# Patient Record
Sex: MALE | Race: OTHER | NOT HISPANIC OR LATINO | URBAN - METROPOLITAN AREA
[De-identification: names, ages, dates, MRNs, and addresses within clinical notes are randomized per-mention and may not be internally consistent; named-entity substitution may affect disease eponyms.]

---

## 2019-11-15 ENCOUNTER — INPATIENT (INPATIENT)
Facility: HOSPITAL | Age: 59
LOS: 4 days | Discharge: HOME | DRG: 418 | End: 2019-11-20
Attending: SURGERY | Admitting: SURGERY
Payer: COMMERCIAL

## 2019-11-15 VITALS
OXYGEN SATURATION: 95 % | HEART RATE: 71 BPM | RESPIRATION RATE: 18 BRPM | HEIGHT: 76 IN | SYSTOLIC BLOOD PRESSURE: 193 MMHG | WEIGHT: 244.93 LBS | DIASTOLIC BLOOD PRESSURE: 125 MMHG | TEMPERATURE: 98 F

## 2019-11-15 DIAGNOSIS — Z72.0 TOBACCO USE: ICD-10-CM

## 2019-11-15 DIAGNOSIS — J43.9 EMPHYSEMA, UNSPECIFIED: ICD-10-CM

## 2019-11-15 DIAGNOSIS — R03.0 ELEVATED BLOOD-PRESSURE READING, WITHOUT DIAGNOSIS OF HYPERTENSION: ICD-10-CM

## 2019-11-15 DIAGNOSIS — Z98.890 OTHER SPECIFIED POSTPROCEDURAL STATES: Chronic | ICD-10-CM

## 2019-11-15 DIAGNOSIS — Z01.818 ENCOUNTER FOR OTHER PREPROCEDURAL EXAMINATION: ICD-10-CM

## 2019-11-15 DIAGNOSIS — K80.00 CALCULUS OF GALLBLADDER WITH ACUTE CHOLECYSTITIS WITHOUT OBSTRUCTION: ICD-10-CM

## 2019-11-15 DIAGNOSIS — R91.1 SOLITARY PULMONARY NODULE: ICD-10-CM

## 2019-11-15 DIAGNOSIS — I25.10 ATHEROSCLEROTIC HEART DISEASE OF NATIVE CORONARY ARTERY WITHOUT ANGINA PECTORIS: ICD-10-CM

## 2019-11-15 DIAGNOSIS — E66.9 OBESITY, UNSPECIFIED: ICD-10-CM

## 2019-11-15 LAB
ALBUMIN SERPL ELPH-MCNC: 3.9 G/DL — SIGNIFICANT CHANGE UP (ref 3.4–5)
ALP SERPL-CCNC: 58 U/L — SIGNIFICANT CHANGE UP (ref 40–120)
ALT FLD-CCNC: 21 U/L — SIGNIFICANT CHANGE UP (ref 12–42)
AMPHET UR-MCNC: NEGATIVE — SIGNIFICANT CHANGE UP
ANION GAP SERPL CALC-SCNC: 9 MMOL/L — SIGNIFICANT CHANGE UP (ref 9–16)
APPEARANCE UR: CLEAR — SIGNIFICANT CHANGE UP
APTT BLD: 33.4 SEC — SIGNIFICANT CHANGE UP (ref 27.5–36.3)
APTT BLD: 33.9 SEC — SIGNIFICANT CHANGE UP (ref 27.5–36.3)
AST SERPL-CCNC: 17 U/L — SIGNIFICANT CHANGE UP (ref 15–37)
BARBITURATES UR SCN-MCNC: NEGATIVE — SIGNIFICANT CHANGE UP
BENZODIAZ UR-MCNC: NEGATIVE — SIGNIFICANT CHANGE UP
BILIRUB SERPL-MCNC: 0.8 MG/DL — SIGNIFICANT CHANGE UP (ref 0.2–1.2)
BILIRUB UR-MCNC: NEGATIVE — SIGNIFICANT CHANGE UP
BUN SERPL-MCNC: 13 MG/DL — SIGNIFICANT CHANGE UP (ref 7–23)
CALCIUM SERPL-MCNC: 8.9 MG/DL — SIGNIFICANT CHANGE UP (ref 8.5–10.5)
CHLORIDE SERPL-SCNC: 105 MMOL/L — SIGNIFICANT CHANGE UP (ref 96–108)
CK SERPL-CCNC: 106 U/L — SIGNIFICANT CHANGE UP (ref 39–308)
CO2 SERPL-SCNC: 26 MMOL/L — SIGNIFICANT CHANGE UP (ref 22–31)
COCAINE METAB.OTHER UR-MCNC: NEGATIVE — SIGNIFICANT CHANGE UP
COLOR SPEC: YELLOW — SIGNIFICANT CHANGE UP
CREAT SERPL-MCNC: 0.99 MG/DL — SIGNIFICANT CHANGE UP (ref 0.5–1.3)
DIFF PNL FLD: ABNORMAL
GLUCOSE SERPL-MCNC: 125 MG/DL — HIGH (ref 70–99)
GLUCOSE UR QL: NEGATIVE — SIGNIFICANT CHANGE UP
HCT VFR BLD CALC: 45.7 % — SIGNIFICANT CHANGE UP (ref 39–50)
HGB BLD-MCNC: 16 G/DL — SIGNIFICANT CHANGE UP (ref 13–17)
INR BLD: 1.01 — SIGNIFICANT CHANGE UP (ref 0.88–1.16)
INR BLD: 1.1 — SIGNIFICANT CHANGE UP (ref 0.88–1.16)
KETONES UR-MCNC: ABNORMAL MG/DL
LEUKOCYTE ESTERASE UR-ACNC: NEGATIVE — SIGNIFICANT CHANGE UP
LIDOCAIN IGE QN: 157 U/L — SIGNIFICANT CHANGE UP (ref 73–393)
MCHC RBC-ENTMCNC: 31.1 PG — SIGNIFICANT CHANGE UP (ref 27–34)
MCHC RBC-ENTMCNC: 35 GM/DL — SIGNIFICANT CHANGE UP (ref 32–36)
MCV RBC AUTO: 88.7 FL — SIGNIFICANT CHANGE UP (ref 80–100)
METHADONE UR-MCNC: NEGATIVE — SIGNIFICANT CHANGE UP
NITRITE UR-MCNC: NEGATIVE — SIGNIFICANT CHANGE UP
NRBC # BLD: 0 /100 WBCS — SIGNIFICANT CHANGE UP (ref 0–0)
OPIATES UR-MCNC: POSITIVE
PCP SPEC-MCNC: SIGNIFICANT CHANGE UP
PCP UR-MCNC: NEGATIVE — SIGNIFICANT CHANGE UP
PH UR: 6 — SIGNIFICANT CHANGE UP (ref 5–8)
PLATELET # BLD AUTO: 238 K/UL — SIGNIFICANT CHANGE UP (ref 150–400)
POTASSIUM SERPL-MCNC: 4.1 MMOL/L — SIGNIFICANT CHANGE UP (ref 3.5–5.3)
POTASSIUM SERPL-SCNC: 4.1 MMOL/L — SIGNIFICANT CHANGE UP (ref 3.5–5.3)
PROT SERPL-MCNC: 7.3 G/DL — SIGNIFICANT CHANGE UP (ref 6.4–8.2)
PROT UR-MCNC: NEGATIVE MG/DL — SIGNIFICANT CHANGE UP
PROTHROM AB SERPL-ACNC: 11.2 SEC — SIGNIFICANT CHANGE UP (ref 10–12.9)
PROTHROM AB SERPL-ACNC: 12.5 SEC — SIGNIFICANT CHANGE UP (ref 10–12.9)
RBC # BLD: 5.15 M/UL — SIGNIFICANT CHANGE UP (ref 4.2–5.8)
RBC # FLD: 13.2 % — SIGNIFICANT CHANGE UP (ref 10.3–14.5)
SODIUM SERPL-SCNC: 140 MMOL/L — SIGNIFICANT CHANGE UP (ref 132–145)
SP GR SPEC: 1.02 — SIGNIFICANT CHANGE UP (ref 1–1.03)
THC UR QL: NEGATIVE — SIGNIFICANT CHANGE UP
TROPONIN I SERPL-MCNC: <0.017 NG/ML — LOW (ref 0.02–0.06)
UROBILINOGEN FLD QL: 0.2 E.U./DL — SIGNIFICANT CHANGE UP
WBC # BLD: 11.59 K/UL — HIGH (ref 3.8–10.5)
WBC # FLD AUTO: 11.59 K/UL — HIGH (ref 3.8–10.5)

## 2019-11-15 PROCEDURE — 71275 CT ANGIOGRAPHY CHEST: CPT | Mod: 26

## 2019-11-15 PROCEDURE — 71045 X-RAY EXAM CHEST 1 VIEW: CPT | Mod: 26

## 2019-11-15 PROCEDURE — 93010 ELECTROCARDIOGRAM REPORT: CPT

## 2019-11-15 PROCEDURE — 99223 1ST HOSP IP/OBS HIGH 75: CPT | Mod: GC

## 2019-11-15 PROCEDURE — 93010 ELECTROCARDIOGRAM REPORT: CPT | Mod: 59

## 2019-11-15 PROCEDURE — 99285 EMERGENCY DEPT VISIT HI MDM: CPT

## 2019-11-15 PROCEDURE — 74174 CTA ABD&PLVS W/CONTRAST: CPT | Mod: 26

## 2019-11-15 PROCEDURE — 76700 US EXAM ABDOM COMPLETE: CPT | Mod: 26

## 2019-11-15 RX ORDER — SODIUM CHLORIDE 9 MG/ML
1000 INJECTION INTRAMUSCULAR; INTRAVENOUS; SUBCUTANEOUS ONCE
Refills: 0 | Status: COMPLETED | OUTPATIENT
Start: 2019-11-15 | End: 2019-11-15

## 2019-11-15 RX ORDER — ENOXAPARIN SODIUM 100 MG/ML
40 INJECTION SUBCUTANEOUS EVERY 24 HOURS
Refills: 0 | Status: DISCONTINUED | OUTPATIENT
Start: 2019-11-15 | End: 2019-11-16

## 2019-11-15 RX ORDER — LABETALOL HCL 100 MG
10 TABLET ORAL ONCE
Refills: 0 | Status: COMPLETED | OUTPATIENT
Start: 2019-11-15 | End: 2019-11-15

## 2019-11-15 RX ORDER — ONDANSETRON 8 MG/1
4 TABLET, FILM COATED ORAL EVERY 6 HOURS
Refills: 0 | Status: DISCONTINUED | OUTPATIENT
Start: 2019-11-15 | End: 2019-11-20

## 2019-11-15 RX ORDER — SODIUM CHLORIDE 9 MG/ML
1000 INJECTION, SOLUTION INTRAVENOUS
Refills: 0 | Status: DISCONTINUED | OUTPATIENT
Start: 2019-11-15 | End: 2019-11-20

## 2019-11-15 RX ORDER — CEFTRIAXONE 500 MG/1
1000 INJECTION, POWDER, FOR SOLUTION INTRAMUSCULAR; INTRAVENOUS EVERY 24 HOURS
Refills: 0 | Status: COMPLETED | OUTPATIENT
Start: 2019-11-15 | End: 2019-11-15

## 2019-11-15 RX ORDER — HYDROMORPHONE HYDROCHLORIDE 2 MG/ML
0.5 INJECTION INTRAMUSCULAR; INTRAVENOUS; SUBCUTANEOUS EVERY 4 HOURS
Refills: 0 | Status: DISCONTINUED | OUTPATIENT
Start: 2019-11-15 | End: 2019-11-20

## 2019-11-15 RX ORDER — METRONIDAZOLE 500 MG
500 TABLET ORAL ONCE
Refills: 0 | Status: DISCONTINUED | OUTPATIENT
Start: 2019-11-15 | End: 2019-11-15

## 2019-11-15 RX ORDER — MORPHINE SULFATE 50 MG/1
2 CAPSULE, EXTENDED RELEASE ORAL ONCE
Refills: 0 | Status: DISCONTINUED | OUTPATIENT
Start: 2019-11-15 | End: 2019-11-15

## 2019-11-15 RX ORDER — ACETAMINOPHEN 500 MG
1000 TABLET ORAL ONCE
Refills: 0 | Status: COMPLETED | OUTPATIENT
Start: 2019-11-15 | End: 2019-11-15

## 2019-11-15 RX ORDER — HYDROMORPHONE HYDROCHLORIDE 2 MG/ML
0.5 INJECTION INTRAMUSCULAR; INTRAVENOUS; SUBCUTANEOUS ONCE
Refills: 0 | Status: DISCONTINUED | OUTPATIENT
Start: 2019-11-15 | End: 2019-11-15

## 2019-11-15 RX ORDER — INFLUENZA VIRUS VACCINE 15; 15; 15; 15 UG/.5ML; UG/.5ML; UG/.5ML; UG/.5ML
0.5 SUSPENSION INTRAMUSCULAR ONCE
Refills: 0 | Status: DISCONTINUED | OUTPATIENT
Start: 2019-11-15 | End: 2019-11-20

## 2019-11-15 RX ORDER — CEFTRIAXONE 500 MG/1
1000 INJECTION, POWDER, FOR SOLUTION INTRAMUSCULAR; INTRAVENOUS EVERY 24 HOURS
Refills: 0 | Status: DISCONTINUED | OUTPATIENT
Start: 2019-11-16 | End: 2019-11-20

## 2019-11-15 RX ORDER — HYDROMORPHONE HYDROCHLORIDE 2 MG/ML
1 INJECTION INTRAMUSCULAR; INTRAVENOUS; SUBCUTANEOUS EVERY 4 HOURS
Refills: 0 | Status: DISCONTINUED | OUTPATIENT
Start: 2019-11-15 | End: 2019-11-20

## 2019-11-15 RX ORDER — METRONIDAZOLE 500 MG
500 TABLET ORAL EVERY 8 HOURS
Refills: 0 | Status: DISCONTINUED | OUTPATIENT
Start: 2019-11-15 | End: 2019-11-20

## 2019-11-15 RX ADMIN — Medication 400 MILLIGRAM(S): at 22:43

## 2019-11-15 RX ADMIN — Medication 10 MILLIGRAM(S): at 09:56

## 2019-11-15 RX ADMIN — HYDROMORPHONE HYDROCHLORIDE 0.5 MILLIGRAM(S): 2 INJECTION INTRAMUSCULAR; INTRAVENOUS; SUBCUTANEOUS at 21:29

## 2019-11-15 RX ADMIN — Medication 100 MILLIGRAM(S): at 21:29

## 2019-11-15 RX ADMIN — SODIUM CHLORIDE 1000 MILLILITER(S): 9 INJECTION INTRAMUSCULAR; INTRAVENOUS; SUBCUTANEOUS at 17:09

## 2019-11-15 RX ADMIN — HYDROMORPHONE HYDROCHLORIDE 0.5 MILLIGRAM(S): 2 INJECTION INTRAMUSCULAR; INTRAVENOUS; SUBCUTANEOUS at 22:14

## 2019-11-15 RX ADMIN — MORPHINE SULFATE 2 MILLIGRAM(S): 50 CAPSULE, EXTENDED RELEASE ORAL at 09:50

## 2019-11-15 RX ADMIN — Medication 1000 MILLIGRAM(S): at 23:13

## 2019-11-15 RX ADMIN — CEFTRIAXONE 100 MILLIGRAM(S): 500 INJECTION, POWDER, FOR SOLUTION INTRAMUSCULAR; INTRAVENOUS at 17:08

## 2019-11-15 RX ADMIN — Medication 10 MILLIGRAM(S): at 10:07

## 2019-11-15 RX ADMIN — SODIUM CHLORIDE 250 MILLILITER(S): 9 INJECTION INTRAMUSCULAR; INTRAVENOUS; SUBCUTANEOUS at 09:45

## 2019-11-15 RX ADMIN — ENOXAPARIN SODIUM 40 MILLIGRAM(S): 100 INJECTION SUBCUTANEOUS at 21:33

## 2019-11-15 RX ADMIN — SODIUM CHLORIDE 140 MILLILITER(S): 9 INJECTION, SOLUTION INTRAVENOUS at 21:29

## 2019-11-15 RX ADMIN — HYDROMORPHONE HYDROCHLORIDE 0.5 MILLIGRAM(S): 2 INJECTION INTRAMUSCULAR; INTRAVENOUS; SUBCUTANEOUS at 17:08

## 2019-11-15 NOTE — CONSULT NOTE ADULT - PROBLEM SELECTOR RECOMMENDATION 9
Pt with findings of acute cholecysitis   - Lap moses 11/16  - fup surgery recs Pt with findings of acute cholecystitis on US and CTA.   - Plan for Lap moses per surgery team   - f/up surgery recs  - NPO

## 2019-11-15 NOTE — ED PROVIDER NOTE - OBJECTIVE STATEMENT
60 yo M w/ no pertinent PMHx BIBEMS from urgent care c/o abdominal/back pain with vomiting since yesterday. Pt was sent in from urgent care after pt was persistently hypertensive during exam. Pt notes onset of symptoms last night with multiple episodes of vomiting. Denies fever, chills, dysuria, hematuria, flank pain, change in urinary/bowel function, numbness, tingling, diarrhea, CP, SOB, palpitations, diaphoresis, dizziness, HA, cough.

## 2019-11-15 NOTE — ED ADULT NURSE NOTE - NSIMPLEMENTINTERV_GEN_ALL_ED
Implemented All Universal Safety Interventions:  Trade to call system. Call bell, personal items and telephone within reach. Instruct patient to call for assistance. Room bathroom lighting operational. Non-slip footwear when patient is off stretcher. Physically safe environment: no spills, clutter or unnecessary equipment. Stretcher in lowest position, wheels locked, appropriate side rails in place.

## 2019-11-15 NOTE — CONSULT NOTE ADULT - ASSESSMENT
60 yo F with PMH of obesity, chronic low back pain, rotator cuff repair, OA  presents with one day hx of abdominal pain. Pain localized to mid epigastric, RUQ region and characterized as dul/aching pain. Found to have acute cholecystitis along with findings of severe coronary calcifications on CTA. Medicine consulted for pre op clearance.

## 2019-11-15 NOTE — CONSULT NOTE ADULT - PROBLEM SELECTOR RECOMMENDATION 7
Emphysematous changes with small and large airways inflammation. 6 mm solid nodule in the left lower lobe. As per Fleischner society 2017 guidelines, interval surveillance in both lower and high risk patients in 6-12 months is suggested to confirm stability.- possible findings of underlying COPD?  - pt currently asymptomatic, saturating well on RA   - oupt pulm follow up for PFT and repeat testing in 6 months for nodule.

## 2019-11-15 NOTE — CONSULT NOTE ADULT - PROBLEM SELECTOR RECOMMENDATION 8
ADDENDUM: multiple lesions found incidentally on CT;   possible benign hemangiomas however will need further workup as outpt w/ MRI or hepatic CT

## 2019-11-15 NOTE — H&P ADULT - HISTORY OF PRESENT ILLNESS
58 y/o male w/ PMHx of Obesity and newly diagnosed HTN and Coronary artery disease (diagnosed on CT Angio), PSHx of rotator cuff repair, presenting w/ one day of constant, poorly localized, abdominal pain, associated w/ NBNB emesis, subjective fever and chills. Denies chest pain, SOB, or change in bowel function. Patient was referred from Urgent care to [facility] after presenting w/ abdominal pain and lower back pain, and BP of high 170s, previously had no known history of HTN. At [facility] he received a CT Angio of the chest and abdomen, which revealed severe coronary artery calcifications as well as moderate to severe atherosclerotic disease of the abdominal and thoracic Aorta. CT also revealed emphysematous changes of the lungs and multiple hypervascular lesions in the liver (likely hemangiomas). Of note patient had colonoscopy 8 years ago, revealed benign polyp (patient unsure if it was removed.)

## 2019-11-15 NOTE — CONSULT NOTE ADULT - PROBLEM SELECTOR RECOMMENDATION 4
Pt found to have /90s on admission in setting of abdominal pain 2/2 acute cholecystitis. No hx of HTN. Pt denies CP, SOB, numbess/tingling. Pt found to have /90s on admission in setting of abdominal pain 2/2 acute cholecystitis. No hx of HTN. Pt denies CP, SOB, numbess/tingling.  - per pt, previous BP readings all normal   - elevated BP likely 2/2 pain although cannot r/o underlying esstential HTN  - continue to monitor   - pain control

## 2019-11-15 NOTE — ED ADULT TRIAGE NOTE - CHIEF COMPLAINT QUOTE
BIBA from urgent care for being hypertensive in office. pt reports N/V, generalized abdominal pain, and lower back pain since yesterday. denies any PMHX, drug or alcohol use. BIBA from urgent care for being hypertensive in office. pt reports N/V, generalized abdominal pain, and lower back pain since yesterday. denies any PMHX, drug or alcohol use. received 8mg zofran ODT PTA from .

## 2019-11-15 NOTE — ED ADULT NURSE NOTE - CHIEF COMPLAINT QUOTE
BIBA from urgent care for being hypertensive in office. pt reports N/V, generalized abdominal pain, and lower back pain since yesterday. denies any PMHX, drug or alcohol use. received 8mg zofran ODT PTA from .

## 2019-11-15 NOTE — H&P ADULT - NSHPPHYSICALEXAM_GEN_ALL_CORE
Vital Signs Last 24 Hrs  T(C): 37.7 (15 Nov 2019 18:19), Max: 37.7 (15 Nov 2019 18:19)  T(F): 99.9 (15 Nov 2019 18:19), Max: 99.9 (15 Nov 2019 18:19)  HR: 75 (15 Nov 2019 18:19) (69 - 80)  BP: 172/90 (15 Nov 2019 18:19) (158/93 - 193/125)  BP(mean): --  RR: 18 (15 Nov 2019 18:19) (16 - 18)  SpO2: 94% (15 Nov 2019 18:19) (94% - 96%    Physical Exam:  General Appearance: Appears well, NAD  HEENT: Grossly symmetric  Chest: Non labored breathing  CV: Pulse regular presently  Abdomen: Soft, RUQ tenderness w/ positive Murphs sign, nondistended, no rebound or guarding   Extremities: Grossly symmetric, no swelling, warm.

## 2019-11-15 NOTE — H&P ADULT - NSHPLABSRESULTS_GEN_ALL_CORE
LABS:                        16.0   11.59 )-----------( 238      ( 15 Nov 2019 09:50 )             45.7     11-15    140  |  105  |  13  ----------------------------<  125<H>  4.1   |  26  |  0.99    Ca    8.9      15 Nov 2019 09:50    TPro  7.3  /  Alb  3.9  /  TBili  0.8  /  DBili  x   /  AST  17  /  ALT  21  /  AlkPhos  58  11-15    PT/INR - ( 15 Nov 2019 09:50 )   PT: 11.2 sec;   INR: 1.01          PTT - ( 15 Nov 2019 09:50 )  PTT:33.9 sec      RADIOLOGY & ADDITIONAL STUDIES:  Ultrasound positive for cholecystitis

## 2019-11-15 NOTE — CONSULT NOTE ADULT - PROBLEM SELECTOR RECOMMENDATION 2
- RCRI class I risk= 3/9 % risk of CV event, including MI  - Misty preiopereative risk= 0.1 % Pt with no significant PMH, found to have severe coronary artery calcifications ans well as moderate to severe atherosclerotic dx of the abdominal and thoracic Aorta. Pt with no hx of CAD, no FH CAD. Previous stress test ~10years ago was normal. Pt denies CP, mild WORLEY when walking >10blocks. Previous smoking hx, quit 10yrs ago, previous hx of alcohol use.   - RCRI class I risk= 3/9 % risk of CV event, including MI  - Misty preiopereative risk= 0.1 %  - recommend Cardiology clearance   - f/up a1c, lipid profile, TSH  - f/up echocardiogram Pt with no significant PMH, found to have severe coronary artery calcifications ans well as moderate to severe atherosclerotic dx of the abdominal and thoracic Aorta. Pt with no hx of CAD, no FH CAD. Previous stress test ~10years ago was normal. Pt denies CP, mild WORLEY when walking >10blocks. Previous smoking hx, quit 10yrs ago, previous hx of alcohol use.   - RCRI class I risk= 3.9 % risk of CV event, including MI  - Jay preiopereative risk= 0.1 %  - recommend Cardiology clearance   - f/up a1c, lipid profile, TSH  - f/up echocardiogram

## 2019-11-15 NOTE — CONSULT NOTE ADULT - ATTENDING COMMENTS
patient seen and examined   reviewed pertinent data, included EKG;  h&p w/ edits   PE findings as above , except pt w/ slight RUQ pain (received pain medications); pt appears slightly uncomfortable and tired appearing    a/p:   1. preop for acute cholecystitis : pt reports able to walk 10 blocks w/o WORLEY; able to walk up 2 flights of stairs w/o dyspnea. appears to have good functional status w/ RCRI Class 1 for intermediate risk procedure. However found to have incidental severe coronary artery calcification and atherosclerotic disease at the aorta on CT angio c/a/p; pt denies hx or sxs of ischemic heart disease; Given CT findings pt would benefit from cardiac evaluation prior to procedure, will likely need statin therapy initiated prior to procedure as well for plaque stabilization;  awaiting lipid panel (to determine statin dosing intensity), A1c, TSH, ECHO. followup cardiology recs.     2. In addition, pt would benefit from anti-hypertensive therapy if BP remains elevated post op.     3. monitor for pulm signs of COPD exacerbation post procedure     rest of plan as above   medicine will follow post op   medical decision making : high complexity patient seen and examined   reviewed pertinent data, included EKG;  h&p w/ edits   PE findings as above , except pt w/ slight RUQ pain (received pain medications); pt appears slightly uncomfortable and tired appearing    a/p:   1. preop for acute cholecystitis : pt reports that he is able to walk 10 blocks w/o WORLEY; able to walk up 2 flights of stairs w/o dyspnea. appears to have good functional status w/ RCRI Class 1 for intermediate risk procedure. However found to have incidental severe coronary artery calcification and atherosclerotic disease at the aorta on CT angio c/a/p; pt denies hx or sxs of ischemic heart disease; Given CT findings pt would benefit from cardiac evaluation prior to procedure, will likely need statin therapy initiated prior to procedure as well for plaque stabilization;  awaiting lipid panel (to determine statin dosing intensity), A1c, TSH, ECHO. followup cardiology recs.     2. In addition, pt would benefit from anti-hypertensive therapy if BP remains elevated post op.     3. pt w/ inicidental lung findings suggesting possible COPD; denies respiratory sxs;  monitor for sxs/signs of COPD exacerbation post procedure ; duonebs prn    rest of plan as above   medicine will follow post op   medical decision making : high complexity

## 2019-11-15 NOTE — CONSULT NOTE ADULT - PROBLEM SELECTOR RECOMMENDATION 3
CT Angio completed which shows severe coronary artery calcifications ans well as moderate to severe atherosclerotic dx of the abdominal and thoracic Aorta. Pt with no hx of CAD, no FH CAD. Previous stress test ~10years ago was normal. Pt denies CP, mild WORLEY when walking >10blocks. Previous smoking hx, quit 10yrs ago, previous hx of alcohol use.   - ECG   - f/up echocardiogram   - f/up A1c, Lipid profile, TSH  - cardiac clearance CT Angio completed which shows severe coronary artery calcifications ans well as moderate to severe atherosclerotic dx of the abdominal and thoracic Aorta. Pt with no hx of CAD, no FH CAD. Previous stress test ~10years ago was normal. Pt denies CP, mild WORLEY when walking >10blocks. Previous smoking hx, quit 10yrs ago, previous hx of alcohol use.   - repeat ECG   - f/up echocardiogram   - f/up A1c, Lipid profile, TSH  - cardiac clearance

## 2019-11-15 NOTE — CONSULT NOTE ADULT - PROBLEM SELECTOR RECOMMENDATION 6
Emphysematous changes with small and large airways inflammation. 6 mm   solid nodule in the left lower lobe. As per Fleischner society 2017   guidelines, interval surveillance in both lower and high risk patients in   6-12 months is suggested to confirm stability. Emphysematous changes with small and large airways inflammation. 6 mm solid nodule in the left lower lobe. As per Fleischner society 2017 guidelines, interval surveillance in both lower and high risk patients in 6-12 months is suggested to confirm stability.  - oupt pulm follow up for PFT and repeat testing in 6 months for nodule.

## 2019-11-15 NOTE — H&P ADULT - ASSESSMENT
59M PMHx obesity and newly diagnosed HTN, CAD presents w/ acute cholecystitis    Pain/Nausea  NPO/IVF  Cef/Flagyl  OOBA/SCDs/SQH  Pre op for OR 11/16  Medicine consult  Cards consult

## 2019-11-16 DIAGNOSIS — I25.10 ATHEROSCLEROTIC HEART DISEASE OF NATIVE CORONARY ARTERY WITHOUT ANGINA PECTORIS: ICD-10-CM

## 2019-11-16 DIAGNOSIS — K76.9 LIVER DISEASE, UNSPECIFIED: ICD-10-CM

## 2019-11-16 LAB
ALBUMIN SERPL ELPH-MCNC: 3.6 G/DL — SIGNIFICANT CHANGE UP (ref 3.3–5)
ALBUMIN SERPL ELPH-MCNC: 3.8 G/DL — SIGNIFICANT CHANGE UP (ref 3.3–5)
ALP SERPL-CCNC: 50 U/L — SIGNIFICANT CHANGE UP (ref 40–120)
ALP SERPL-CCNC: 50 U/L — SIGNIFICANT CHANGE UP (ref 40–120)
ALT FLD-CCNC: 22 U/L — SIGNIFICANT CHANGE UP (ref 10–45)
ALT FLD-CCNC: 53 U/L — HIGH (ref 10–45)
ANION GAP SERPL CALC-SCNC: 11 MMOL/L — SIGNIFICANT CHANGE UP (ref 5–17)
ANION GAP SERPL CALC-SCNC: 12 MMOL/L — SIGNIFICANT CHANGE UP (ref 5–17)
AST SERPL-CCNC: 21 U/L — SIGNIFICANT CHANGE UP (ref 10–40)
AST SERPL-CCNC: 57 U/L — HIGH (ref 10–40)
BILIRUB SERPL-MCNC: 1 MG/DL — SIGNIFICANT CHANGE UP (ref 0.2–1.2)
BILIRUB SERPL-MCNC: 1.4 MG/DL — HIGH (ref 0.2–1.2)
BUN SERPL-MCNC: 10 MG/DL — SIGNIFICANT CHANGE UP (ref 7–23)
BUN SERPL-MCNC: 12 MG/DL — SIGNIFICANT CHANGE UP (ref 7–23)
CALCIUM SERPL-MCNC: 8.3 MG/DL — LOW (ref 8.4–10.5)
CALCIUM SERPL-MCNC: 8.4 MG/DL — SIGNIFICANT CHANGE UP (ref 8.4–10.5)
CHLORIDE SERPL-SCNC: 102 MMOL/L — SIGNIFICANT CHANGE UP (ref 96–108)
CHLORIDE SERPL-SCNC: 103 MMOL/L — SIGNIFICANT CHANGE UP (ref 96–108)
CHOLEST SERPL-MCNC: 170 MG/DL — SIGNIFICANT CHANGE UP (ref 10–199)
CO2 SERPL-SCNC: 23 MMOL/L — SIGNIFICANT CHANGE UP (ref 22–31)
CO2 SERPL-SCNC: 24 MMOL/L — SIGNIFICANT CHANGE UP (ref 22–31)
CREAT SERPL-MCNC: 0.88 MG/DL — SIGNIFICANT CHANGE UP (ref 0.5–1.3)
CREAT SERPL-MCNC: 0.95 MG/DL — SIGNIFICANT CHANGE UP (ref 0.5–1.3)
GLUCOSE SERPL-MCNC: 122 MG/DL — HIGH (ref 70–99)
GLUCOSE SERPL-MCNC: 162 MG/DL — HIGH (ref 70–99)
HBA1C BLD-MCNC: 5.3 % — SIGNIFICANT CHANGE UP (ref 4–5.6)
HCT VFR BLD CALC: 44 % — SIGNIFICANT CHANGE UP (ref 39–50)
HCT VFR BLD CALC: 44.9 % — SIGNIFICANT CHANGE UP (ref 39–50)
HCV AB S/CO SERPL IA: 0.15 S/CO — SIGNIFICANT CHANGE UP
HCV AB SERPL-IMP: SIGNIFICANT CHANGE UP
HDLC SERPL-MCNC: 49 MG/DL — SIGNIFICANT CHANGE UP
HGB BLD-MCNC: 14.4 G/DL — SIGNIFICANT CHANGE UP (ref 13–17)
HGB BLD-MCNC: 15 G/DL — SIGNIFICANT CHANGE UP (ref 13–17)
INR BLD: 1.18 — HIGH (ref 0.88–1.16)
LIPID PNL WITH DIRECT LDL SERPL: 104 MG/DL — HIGH
MAGNESIUM SERPL-MCNC: 1.9 MG/DL — SIGNIFICANT CHANGE UP (ref 1.6–2.6)
MCHC RBC-ENTMCNC: 30.2 PG — SIGNIFICANT CHANGE UP (ref 27–34)
MCHC RBC-ENTMCNC: 30.4 PG — SIGNIFICANT CHANGE UP (ref 27–34)
MCHC RBC-ENTMCNC: 32.7 GM/DL — SIGNIFICANT CHANGE UP (ref 32–36)
MCHC RBC-ENTMCNC: 33.4 GM/DL — SIGNIFICANT CHANGE UP (ref 32–36)
MCV RBC AUTO: 91.1 FL — SIGNIFICANT CHANGE UP (ref 80–100)
MCV RBC AUTO: 92.2 FL — SIGNIFICANT CHANGE UP (ref 80–100)
NRBC # BLD: 0 /100 WBCS — SIGNIFICANT CHANGE UP (ref 0–0)
NRBC # BLD: 0 /100 WBCS — SIGNIFICANT CHANGE UP (ref 0–0)
PHOSPHATE SERPL-MCNC: 3.1 MG/DL — SIGNIFICANT CHANGE UP (ref 2.5–4.5)
PLATELET # BLD AUTO: 198 K/UL — SIGNIFICANT CHANGE UP (ref 150–400)
PLATELET # BLD AUTO: 199 K/UL — SIGNIFICANT CHANGE UP (ref 150–400)
POTASSIUM SERPL-MCNC: 3.8 MMOL/L — SIGNIFICANT CHANGE UP (ref 3.5–5.3)
POTASSIUM SERPL-MCNC: 4 MMOL/L — SIGNIFICANT CHANGE UP (ref 3.5–5.3)
POTASSIUM SERPL-SCNC: 3.8 MMOL/L — SIGNIFICANT CHANGE UP (ref 3.5–5.3)
POTASSIUM SERPL-SCNC: 4 MMOL/L — SIGNIFICANT CHANGE UP (ref 3.5–5.3)
PROT SERPL-MCNC: 6.4 G/DL — SIGNIFICANT CHANGE UP (ref 6–8.3)
PROT SERPL-MCNC: 6.7 G/DL — SIGNIFICANT CHANGE UP (ref 6–8.3)
PROTHROM AB SERPL-ACNC: 13.4 SEC — HIGH (ref 10–12.9)
RBC # BLD: 4.77 M/UL — SIGNIFICANT CHANGE UP (ref 4.2–5.8)
RBC # BLD: 4.93 M/UL — SIGNIFICANT CHANGE UP (ref 4.2–5.8)
RBC # FLD: 13.2 % — SIGNIFICANT CHANGE UP (ref 10.3–14.5)
RBC # FLD: 13.3 % — SIGNIFICANT CHANGE UP (ref 10.3–14.5)
SODIUM SERPL-SCNC: 137 MMOL/L — SIGNIFICANT CHANGE UP (ref 135–145)
SODIUM SERPL-SCNC: 138 MMOL/L — SIGNIFICANT CHANGE UP (ref 135–145)
TOTAL CHOLESTEROL/HDL RATIO MEASUREMENT: 3.5 RATIO — SIGNIFICANT CHANGE UP (ref 3.4–9.6)
TRIGL SERPL-MCNC: 84 MG/DL — SIGNIFICANT CHANGE UP (ref 10–149)
TSH SERPL-MCNC: 0.68 UIU/ML — SIGNIFICANT CHANGE UP (ref 0.35–4.94)
WBC # BLD: 10.9 K/UL — HIGH (ref 3.8–10.5)
WBC # BLD: 11.19 K/UL — HIGH (ref 3.8–10.5)
WBC # FLD AUTO: 10.9 K/UL — HIGH (ref 3.8–10.5)
WBC # FLD AUTO: 11.19 K/UL — HIGH (ref 3.8–10.5)

## 2019-11-16 PROCEDURE — 99221 1ST HOSP IP/OBS SF/LOW 40: CPT

## 2019-11-16 RX ORDER — ATORVASTATIN CALCIUM 80 MG/1
40 TABLET, FILM COATED ORAL DAILY
Refills: 0 | Status: DISCONTINUED | OUTPATIENT
Start: 2019-11-16 | End: 2019-11-16

## 2019-11-16 RX ORDER — MORPHINE SULFATE 50 MG/1
2 CAPSULE, EXTENDED RELEASE ORAL ONCE
Refills: 0 | Status: DISCONTINUED | OUTPATIENT
Start: 2019-11-16 | End: 2019-11-16

## 2019-11-16 RX ADMIN — Medication 100 MILLIGRAM(S): at 21:32

## 2019-11-16 RX ADMIN — CEFTRIAXONE 100 MILLIGRAM(S): 500 INJECTION, POWDER, FOR SOLUTION INTRAMUSCULAR; INTRAVENOUS at 17:02

## 2019-11-16 RX ADMIN — ATORVASTATIN CALCIUM 40 MILLIGRAM(S): 80 TABLET, FILM COATED ORAL at 06:23

## 2019-11-16 RX ADMIN — MORPHINE SULFATE 2 MILLIGRAM(S): 50 CAPSULE, EXTENDED RELEASE ORAL at 14:45

## 2019-11-16 RX ADMIN — SODIUM CHLORIDE 140 MILLILITER(S): 9 INJECTION, SOLUTION INTRAVENOUS at 08:51

## 2019-11-16 RX ADMIN — HYDROMORPHONE HYDROCHLORIDE 0.5 MILLIGRAM(S): 2 INJECTION INTRAMUSCULAR; INTRAVENOUS; SUBCUTANEOUS at 06:23

## 2019-11-16 RX ADMIN — Medication 100 MILLIGRAM(S): at 15:01

## 2019-11-16 RX ADMIN — HYDROMORPHONE HYDROCHLORIDE 1 MILLIGRAM(S): 2 INJECTION INTRAMUSCULAR; INTRAVENOUS; SUBCUTANEOUS at 23:01

## 2019-11-16 RX ADMIN — SODIUM CHLORIDE 140 MILLILITER(S): 9 INJECTION, SOLUTION INTRAVENOUS at 17:03

## 2019-11-16 RX ADMIN — HYDROMORPHONE HYDROCHLORIDE 0.5 MILLIGRAM(S): 2 INJECTION INTRAMUSCULAR; INTRAVENOUS; SUBCUTANEOUS at 19:13

## 2019-11-16 RX ADMIN — HYDROMORPHONE HYDROCHLORIDE 0.5 MILLIGRAM(S): 2 INJECTION INTRAMUSCULAR; INTRAVENOUS; SUBCUTANEOUS at 14:09

## 2019-11-16 RX ADMIN — Medication 100 MILLIGRAM(S): at 05:03

## 2019-11-16 RX ADMIN — HYDROMORPHONE HYDROCHLORIDE 0.5 MILLIGRAM(S): 2 INJECTION INTRAMUSCULAR; INTRAVENOUS; SUBCUTANEOUS at 07:21

## 2019-11-16 RX ADMIN — HYDROMORPHONE HYDROCHLORIDE 0.5 MILLIGRAM(S): 2 INJECTION INTRAMUSCULAR; INTRAVENOUS; SUBCUTANEOUS at 18:58

## 2019-11-16 RX ADMIN — HYDROMORPHONE HYDROCHLORIDE 1 MILLIGRAM(S): 2 INJECTION INTRAMUSCULAR; INTRAVENOUS; SUBCUTANEOUS at 23:45

## 2019-11-16 RX ADMIN — MORPHINE SULFATE 2 MILLIGRAM(S): 50 CAPSULE, EXTENDED RELEASE ORAL at 15:15

## 2019-11-16 RX ADMIN — Medication 1 DROP(S): at 19:34

## 2019-11-16 NOTE — PROVIDER CONTACT NOTE (OTHER) - ASSESSMENT
Pt is currently on telemetry monitoring but there are two different orders for telemetry monitoring and regional bed.

## 2019-11-16 NOTE — PROGRESS NOTE ADULT - SUBJECTIVE AND OBJECTIVE BOX
Procedure: Laparoscopic cholecystectomy  Surgeon: Shashi    S: Pt states he has some soreness near incision sites, but feels better than prior to surgery. Denies N/V, CP, SOB, WORLEY, calf tenderness. Pain controlled with medication.    O:  T(C): 36.3 (11-16-19 @ 15:30), Max: 36.3 (11-16-19 @ 15:30)  T(F): 97.3 (11-16-19 @ 15:30), Max: 97.3 (11-16-19 @ 15:30)  HR: 78 (11-16-19 @ 15:30) (70 - 84)  BP: 131/80 (11-16-19 @ 15:30) (131/80 - 169/90)  RR: 19 (11-16-19 @ 15:30) (11 - 19)  SpO2: 95% (11-16-19 @ 15:30) (92% - 95%)  Wt(kg): --                        15.0   11.19 )-----------( 198      ( 16 Nov 2019 05:48 )             44.9     11-16    138  |  102  |  10  ----------------------------<  122<H>  3.8   |  24  |  0.95    Ca    8.4      16 Nov 2019 05:48  Phos  3.1     11-16  Mg     1.9     11-16    TPro  6.7  /  Alb  3.8  /  TBili  1.4<H>  /  DBili  x   /  AST  21  /  ALT  22  /  AlkPhos  50  11-16      Gen: NAD, resting comfortably in bed  C/V: NSR  Pulm: Nonlabored breathing, no respiratory distress  Abd: soft, tender near incision sites, non-distended. Incision; c/d/i, lyric in place with sanguinous output  Extrem: WWP, no calf edema, SCDs in place      A/P: 59yMale s/p above procedure  Diet: NPO  IVF: LR  Pain/nausea control  DVT ppx: scds  Dispo plan: tele

## 2019-11-16 NOTE — CONSULT NOTE ADULT - ATTENDING COMMENTS
Assessment: Patient personally seen and examined myself during rounds with the Fellow  ON DATE 11/16/19  Note read, including vitals, physical findings, laboratory data, and radiological reports.   Agree with above with revisions, if any included below.   - As Above  - Proceed to surgery

## 2019-11-16 NOTE — CONSULT NOTE ADULT - SUBJECTIVE AND OBJECTIVE BOX
HPI:  59 F w/ no sig pmhx presents with one day hx of abdominal pain. Pain localized to mid epigastric, RUQ region and characterized as dul/aching pain. Associated symptoms include 15 episodes of NBNB vomiting, worsening lower back pain, and subjective fevers/chills. Patient admitted to surgical service for acute cholecystitis, plan for laparoscopic cholecystectomy today 19.  Patient had CT Angio due to lower back pain and SBP in 170s. CT Angio completed which shows severe coronary artery calcifications and well as moderate to severe atherosclerotic dx of the abdominal and thoracic Aorta as well as findings of acute cholecystitis. Cardiology consulted for pre-operative clearance.     Patient seen and examined. He reports hx a diagnosis of "high cholesterol", which has been treated with "diet and exercise". Patient does not take any medications at home. He has no chest pain or exertional symptoms. He exercises 3 times weekly, able to walk > 10 blocks, > 3 flights of stairs. Reports doing high intensity exercises as well without chest discomfort. No family hx of cardiovascular disease.       CARDIAC DIAGNOSTIC TESTING:      ECG: NSR, no ischemic changes     ECHO  FINDINGS: pending     PAST MEDICAL & SURGICAL HISTORY:  Obesity  HTN (hypertension)  Coronary artery disease  S/P rotator cuff repair      HOME MEDICATIONS  T(C): 37.1 (19 @ 05:02), Max: 38 (11-15-19 @ 21:09)  HR: 85 (19 @ 05:02) (69 - 93)  BP: 164/74 (19 @ 05:02) (133/80 - 193/125)  RR: 17 (19 @ 05:02) (16 - 18)  SpO2: 93% (19 @ 05:02) (93% - 96%)    MEDICATIONS  (STANDING):  cefTRIAXone   IVPB 1000 milliGRAM(s) IV Intermittent every 24 hours  enoxaparin Injectable 40 milliGRAM(s) SubCutaneous every 24 hours  influenza   Vaccine 0.5 milliLiter(s) IntraMuscular once  lactated ringers. 1000 milliLiter(s) (140 mL/Hr) IV Continuous <Continuous>  metroNIDAZOLE  IVPB 500 milliGRAM(s) IV Intermittent every 8 hours    MEDICATIONS  (PRN):  HYDROmorphone  Injectable 0.5 milliGRAM(s) IV Push every 4 hours PRN Moderate Pain (4 - 6)  HYDROmorphone  Injectable 1 milliGRAM(s) IV Push every 4 hours PRN Severe Pain (7 - 10)  ondansetron Injectable 4 milliGRAM(s) IV Push every 6 hours PRN Nausea      FAMILY HISTORY:      SOCIAL HISTORY:  - Smoking: no  - Alcohol: no   - Recreational drug use: no    REVIEW OF SYSTEMS:  CONSTITUTIONAL: No fever, weight loss, or fatigue  EYES: No eye pain, visual disturbances, or discharge  ENMT:  No difficulty hearing, tinnitus, vertigo; No sinus or throat pain  NECK: No pain or stiffness  RESPIRATORY: No cough, wheezing, chills or hemoptysis; No Shortness of Breath  CARDIOVASCULAR: No chest pain, palpitations, passing out, dizziness, or leg swelling  GASTROINTESTINAL: per HPI  GENITOURINARY: No dysuria, frequency, hematuria, or incontinence  NEUROLOGICAL: No headaches, memory loss, loss of strength, numbness, or tremors  SKIN: No itching, burning, rashes, or lesions   LYMPH Nodes: No enlarged glands  ENDOCRINE: No heat or cold intolerance; No hair loss  MUSCULOSKELETAL: No joint pain or swelling; No muscle, back, or extremity pain  PSYCHIATRIC: No depression, anxiety, mood swings, or difficulty sleeping  HEME/LYMPH: No easy bruising, or bleeding gums  ALLERY AND IMMUNOLOGIC: No hives or eczema    Vitals past 24 Hours: T(C): 37.1 (19 @ 05:02), Max: 38 (11-15-19 @ 21:09)  HR: 85 (19 @ 05:02) (69 - 93)  BP: 164/74 (19 @ 05:02) (133/80 - 193/125)  RR: 17 (19 @ 05:02) (16 - 18)  SpO2: 93% (19 @ 05:02) (93% - 96%)	    PHYSICAL EXAM:  GEN: No acute respiratory distress, appears stated age	  HEENT: Anicteric sclera, PERRL, EOMI, no oropharyngeal erythema or discharge, trachea midline  Lymphatic: No cervical lymphadenopathy  Cardiovascular: S1 S2, No JVD, No murmurs, PMI  Respiratory: Lungs clear to auscultation, no rrw  Gastrointestinal:  BS+, soft, non distended, non tender, no HSM  Skin: No rashes, No ecchymoses, No cyanosis  Neurologic: Non-focal, AAO x 3, strength grossly normal in all extremeties  Extremities: Normal range of motion, No clubbing, cyanosis or edema  Vascular: Radial 2+, DP 2+      I&O's Detail    15 Nov 2019 07:01  -  2019 05:14  --------------------------------------------------------  IN:    IV PiggyBack: 200 mL    lactated ringers.: 840 mL  Total IN: 1040 mL    OUT:    Voided: 700 mL  Total OUT: 700 mL    Total NET: 340 mL          LABS:                        16.0   11.59 )-----------( 238      ( 15 Nov 2019 09:50 )             45.7     11-15    140  |  105  |  13  ----------------------------<  125<H>  4.1   |  26  |  0.99    Ca    8.9      15 Nov 2019 09:50    TPro  7.3  /  Alb  3.9  /  TBili  0.8  /  DBili  x   /  AST  17  /  ALT  21  /  AlkPhos  58  11-15    CARDIAC MARKERS ( 15 Nov 2019 09:50 )  <0.017 ng/mL / x     / 106 U/L / x     / x          PT/INR - ( 15 Nov 2019 20:41 )   PT: 12.5 sec;   INR: 1.10          PTT - ( 15 Nov 2019 20:41 )  PTT:33.4 sec  Urinalysis Basic - ( 15 Nov 2019 21:02 )    Color: Yellow / Appearance: Clear / S.020 / pH: x  Gluc: x / Ketone: Trace mg/dL  / Bili: Negative / Urobili: 0.2 E.U./dL   Blood: x / Protein: NEGATIVE mg/dL / Nitrite: NEGATIVE   Leuk Esterase: NEGATIVE / RBC: < 5 /HPF / WBC < 5 /HPF   Sq Epi: x / Non Sq Epi: 0-5 /HPF / Bacteria: x      I&O's Summary    15 Nov 2019 07:  -  2019 05:14  --------------------------------------------------------  IN: 1040 mL / OUT: 700 mL / NET: 340 mL        RADIOLOGY & ADDITIONAL STUDIES:  < from: CT Angio Abdomen and Pelvis w/ IV Cont (11.15.19 @ 11:40) >    EXAM:  CT ANGIO ABD PELV (W)AW IC                        EXAM:  CT ANGIO CHEST (W)AW IC                           PROCEDURE DATE:  11/15/2019          INTERPRETATION:  CTA (CT angiography) of the CHEST, ABDOMEN AND PELVIS    INDICATION: Hypertension, chest/abdominal/back pain.     TECHNIQUE: CT of the chest without intravenous contrast was performed,   followed by CT angiography of the chest, abdomen and pelvis was performed   during bolus injection of intravenous contrast.  Post-processing   including the production of axial, coronal and sagittal multiplanar   reformatted images and axial and coronal maximum intensity projections   (MIPs) was performed.    Comparison: None.    Findings:     VESSELS: No aortic dissection or intramural hematoma. No pulmonary   embolism is visualized. Mild main pulmonary artery dilatation measuring   3.3 cm. There is mixed calcified and soft atheromatous plaque in the   descending aorta, abdominal aorta, and iliac arteries. The mesenteric   arteries are patent. No critical stenosis.    LUNGS AND LARGE AIRWAYS: Substantial paraseptal and confluent   centrilobular edema most pronounced within the right upper lobe.   Scattered solid micronodules are noted and delineated with arrows in   series 3. There is a solid 6 mm nodule within the posterior basal segment   of the right lower lobe (3:261). Scattered linear areas of parenchymal   scarring and/or atelectasis are noted within the right middle and right   lower lobes with adjacent areas of traction bronchiectasis to suggest   scarring. Bibasilar dependent atelectatic changes are noted.    PLEURA:  No pleural effusion.    MEDIASTINUM AND HILAR REGIONS: No thoracic lymphadenopathy.    HEART AND PERICARDIUM:  Mild cardiomegaly. No pericardial effusion.    CHEST WALL AND LOWER NECK:  Normal.    LIVER:  3.4 cm hepatic cyst in segment 4A. 4.2 x 5.5 cm exophytic   hypodense mass (6:94) arising from the inferior tip of the right hepatic   lobe which has a lobulated border and demonstrates nodular peripheral   enhancement. Additional hypervascular lesions are noted in the   subcapsular region of segment 7 which may demonstrate a peripheral   nodular enhancement pattern measuring 1.6 x 1 cm (6:34).    GALLBLADDER: Markedly distended, measuring 14 cm in length. A 3.0 cm   probable oval large gallstone is noted in the gallbladder neck region.   There is smooth gallbladder wall thickening however no significant   pericholecystic inflammatory changes are noted. No biliary dilatation.    SPLEEN:  Normal.    PANCREAS:  Normal.    ADRENAL GLANDS:  Normal.    KIDNEYS: Normal.    GASTROINTESTINAL TRACT: Mild uncomplicated sigmoid diverticulosis.    REPRODUCTIVE ORGANS: Prominent sized prostate gland. No regional   lymphadenopathy.    BLADDER: Withinnormal limits    RETROPERITONEUM: No retroperitoneal or pelvic adenopathy    PERITONEUM:No ascites, no free air.    SOFT TISSUES: Within normal limits    MUSCULOSKELETAL: Moderate to severe multilevel degenerative changes of   the spine, most pronounced at T12 where there is an anterior wedging   deformity.      IMPRESSION:     No aortic dissection, aortic aneurysm, or critical stenosis. Moderate   calcified atheromatous plaque formation in the thoracic aorta and   moderate to severe calcified and noncalcified atheromatous plaque   involving the abdominal aorta. Severe coronary artery calcification.    Moderate to severe multilevel degenerative changes of the spine, most   pronounced at T12 where there is an anterior wedging deformity.    Markedly distended gallbladder with smooth gallbladder wall thickening   and a probable solitary large 3 cm gallstone in the gallbladder fundus   region. Early cholecystitis with gallbladder hydrops secondary to cystic   duct obstruction cannot be excluded. Further imaging to include   hepatobiliary ultrasound is suggested. No pericholecystic phlegmon.     Multiple hypervascular and peripherally enhancing hepatic lesions which   may represent benign hemangiomas however further characterization of   these findings with a contrast-enhanced hepatic CT and/or MRI is   suggested utilizing hepatic mass protocol.    Emphysematous changes with small and large airways inflammation. 6 mm   solid nodule in the left lower lobe. As per Fleischner society 2017  guidelines, interval surveillance in both lower and high risk patients in   6-12 months is suggested to confirm stability.    < end of copied text >

## 2019-11-16 NOTE — CONSULT NOTE ADULT - ASSESSMENT
59 F w/ no sig pmhx admitted to surgical service for acute cholecystectomy, plan for laparoscopic cholecystectomy today 11/16/19. Cardiology consulted for pre-operative clearance in setting of severe aortic and coronary calcification with atheromatous plaques.     #Pre-Operative Clearance: Great functional capacity > 5 METS without exertional symptoms. ECG: NSR without ischemic changes, troponin negative.    RCRI: Class II risk, (1 point), Jay 0.1%.   Would obtain transthoracic echocardiogram prior to procedure   In the setting of HLD and severe aortic and coronary calcifications, would Lipitor 40mg qhs   Please obtain daily ECG's     Cardiology consult with follow   To be discussed w/ attending 59 F w/ no sig pmhx admitted to surgical service for acute cholecystectomy, plan for laparoscopic cholecystectomy today 11/16/19. Cardiology consulted for pre-operative clearance in setting of severe aortic and coronary calcification with atheromatous plaques.     #Pre-Operative Clearance: Great functional capacity > 5 METS without exertional symptoms. ECG: NSR without ischemic changes, troponin negative.    RCRI: Class II risk, (1 point), Jay 0.1%.   Can obtain TTE prior to discharge   No statin in setting of hepatobiliary procedure; can start prior to discharge if LFT's stable   Please obtain daily ECG's   Patient optimized for surgery from cardiology perspective     Cardiology will follow   Discussed w/ attending

## 2019-11-16 NOTE — PROGRESS NOTE ADULT - SUBJECTIVE AND OBJECTIVE BOX
INTERVAL HPI/OVERNIGHT EVENTS: Pt admitted overnight with diagnosis of acute cholecystitis. This AM pt is feeling well, aside from RUQ pain. Denies n/v/cp/sob.     MEDICATIONS  (STANDING):  atorvastatin 40 milliGRAM(s) Oral daily  cefTRIAXone   IVPB 1000 milliGRAM(s) IV Intermittent every 24 hours  enoxaparin Injectable 40 milliGRAM(s) SubCutaneous every 24 hours  influenza   Vaccine 0.5 milliLiter(s) IntraMuscular once  lactated ringers. 1000 milliLiter(s) (140 mL/Hr) IV Continuous <Continuous>  metroNIDAZOLE  IVPB 500 milliGRAM(s) IV Intermittent every 8 hours    MEDICATIONS  (PRN):  HYDROmorphone  Injectable 0.5 milliGRAM(s) IV Push every 4 hours PRN Moderate Pain (4 - 6)  HYDROmorphone  Injectable 1 milliGRAM(s) IV Push every 4 hours PRN Severe Pain (7 - 10)  ondansetron Injectable 4 milliGRAM(s) IV Push every 6 hours PRN Nausea      Vital Signs Last 24 Hrs  T(C): 37.1 (2019 05:02), Max: 38 (15 Nov 2019 21:09)  T(F): 98.8 (2019 05:02), Max: 100.4 (15 Nov 2019 21:09)  HR: 85 (2019 05:02) (69 - 93)  BP: 164/74 (2019 05:02) (133/80 - 191/104)  BP(mean): --  RR: 17 (2019 05:02) (16 - 18)  SpO2: 93% (2019 05:02) (93% - 96%)    PHYSICAL EXAM:      Constitutional: A&Ox3    Respiratory: non labored breathing, no respiratory distress    Cardiovascular: NSR, RRR    Gastrointestinal: abdomen is soft, tender to palpation in RUQ, non-distended                  Extremities: (-) edema                  I&O's Detail    15 Nov 2019 07:01  -  2019 07:00  --------------------------------------------------------  IN:    IV PiggyBack: 200 mL    lactated ringers.: 1260 mL  Total IN: 1460 mL    OUT:    Voided: 950 mL  Total OUT: 950 mL    Total NET: 510 mL          LABS:                        15.0   11.19 )-----------( 198      ( 2019 05:48 )             44.9         138  |  102  |  10  ----------------------------<  122<H>  3.8   |  24  |  0.95    Ca    8.4      2019 05:48  Phos  3.1       Mg     1.9         TPro  6.7  /  Alb  3.8  /  TBili  1.4<H>  /  DBili  x   /  AST  21  /  ALT  22  /  AlkPhos  50      PT/INR - ( 2019 05:48 )   PT: 13.4 sec;   INR: 1.18          PTT - ( 15 Nov 2019 20:41 )  PTT:33.4 sec  Urinalysis Basic - ( 15 Nov 2019 21:02 )    Color: Yellow / Appearance: Clear / S.020 / pH: x  Gluc: x / Ketone: Trace mg/dL  / Bili: Negative / Urobili: 0.2 E.U./dL   Blood: x / Protein: NEGATIVE mg/dL / Nitrite: NEGATIVE   Leuk Esterase: NEGATIVE / RBC: < 5 /HPF / WBC < 5 /HPF   Sq Epi: x / Non Sq Epi: 0-5 /HPF / Bacteria: x        RADIOLOGY & ADDITIONAL STUDIES:

## 2019-11-16 NOTE — PROVIDER CONTACT NOTE (OTHER) - ASSESSMENT
Pt's pulse oximeter cable is malfunctioning despite changing probes and sites and plugging and unplugging cable. Unable to switch pulse oximeter cable from monitor because it's locked. [employer] dept unavailable during the weekend. With portable pulse oximeter, O2 sat=93% with O2 2L NC. Pt also stated he has "shallow breathing" due to abdominal pain.

## 2019-11-16 NOTE — PROGRESS NOTE ADULT - ASSESSMENT
59M PMHx obesity, newly diagnosed CAD & HTN presents w/ acute cholecystitis    Pain/Nausea  NPO/IVF  Cef/Flagyl  OOBA/SCDs/SQH  Pre op for OR 11/16  Medicine consult - Lipid profile/TSH/HA1C for AM Labs  Cards consult - ECHO, Lipitor 40.

## 2019-11-16 NOTE — BRIEF OPERATIVE NOTE - OPERATION/FINDINGS
Preop Dx: Acute Cholecystitis  Postop Dx: Same as above  Procedure: Laparoscopic Cholecystectomy  Findings: Abdomen accessed via open cutdown. Inflamed, edematous gallbladder encountered. Cystic duct and cystic artery identified, clipped, and divided. Gallbladder dissected off of liver bed. Hemostasis obtained. 19Fr Michel drain left in gallbladder fossa. RUQ irrigated thoroughly.

## 2019-11-17 LAB
ALBUMIN SERPL ELPH-MCNC: 3.3 G/DL — SIGNIFICANT CHANGE UP (ref 3.3–5)
ALP SERPL-CCNC: 57 U/L — SIGNIFICANT CHANGE UP (ref 40–120)
ALT FLD-CCNC: 46 U/L — HIGH (ref 10–45)
ANION GAP SERPL CALC-SCNC: 10 MMOL/L — SIGNIFICANT CHANGE UP (ref 5–17)
AST SERPL-CCNC: 47 U/L — HIGH (ref 10–40)
BILIRUB SERPL-MCNC: 0.8 MG/DL — SIGNIFICANT CHANGE UP (ref 0.2–1.2)
BUN SERPL-MCNC: 13 MG/DL — SIGNIFICANT CHANGE UP (ref 7–23)
CALCIUM SERPL-MCNC: 8.2 MG/DL — LOW (ref 8.4–10.5)
CHLORIDE SERPL-SCNC: 102 MMOL/L — SIGNIFICANT CHANGE UP (ref 96–108)
CO2 SERPL-SCNC: 25 MMOL/L — SIGNIFICANT CHANGE UP (ref 22–31)
CREAT SERPL-MCNC: 0.84 MG/DL — SIGNIFICANT CHANGE UP (ref 0.5–1.3)
GLUCOSE SERPL-MCNC: 129 MG/DL — HIGH (ref 70–99)
GRAM STN FLD: SIGNIFICANT CHANGE UP
HCT VFR BLD CALC: 42.5 % — SIGNIFICANT CHANGE UP (ref 39–50)
HGB BLD-MCNC: 13.8 G/DL — SIGNIFICANT CHANGE UP (ref 13–17)
MAGNESIUM SERPL-MCNC: 2.1 MG/DL — SIGNIFICANT CHANGE UP (ref 1.6–2.6)
MCHC RBC-ENTMCNC: 30.2 PG — SIGNIFICANT CHANGE UP (ref 27–34)
MCHC RBC-ENTMCNC: 32.5 GM/DL — SIGNIFICANT CHANGE UP (ref 32–36)
MCV RBC AUTO: 93 FL — SIGNIFICANT CHANGE UP (ref 80–100)
NRBC # BLD: 0 /100 WBCS — SIGNIFICANT CHANGE UP (ref 0–0)
PHOSPHATE SERPL-MCNC: 2.5 MG/DL — SIGNIFICANT CHANGE UP (ref 2.5–4.5)
PLATELET # BLD AUTO: 193 K/UL — SIGNIFICANT CHANGE UP (ref 150–400)
POTASSIUM SERPL-MCNC: 4.2 MMOL/L — SIGNIFICANT CHANGE UP (ref 3.5–5.3)
POTASSIUM SERPL-SCNC: 4.2 MMOL/L — SIGNIFICANT CHANGE UP (ref 3.5–5.3)
PROT SERPL-MCNC: 6.3 G/DL — SIGNIFICANT CHANGE UP (ref 6–8.3)
RBC # BLD: 4.57 M/UL — SIGNIFICANT CHANGE UP (ref 4.2–5.8)
RBC # FLD: 13.2 % — SIGNIFICANT CHANGE UP (ref 10.3–14.5)
SODIUM SERPL-SCNC: 137 MMOL/L — SIGNIFICANT CHANGE UP (ref 135–145)
SPECIMEN SOURCE: SIGNIFICANT CHANGE UP
WBC # BLD: 12.6 K/UL — HIGH (ref 3.8–10.5)
WBC # FLD AUTO: 12.6 K/UL — HIGH (ref 3.8–10.5)

## 2019-11-17 PROCEDURE — 99233 SBSQ HOSP IP/OBS HIGH 50: CPT

## 2019-11-17 RX ORDER — HEPARIN SODIUM 5000 [USP'U]/ML
5000 INJECTION INTRAVENOUS; SUBCUTANEOUS EVERY 8 HOURS
Refills: 0 | Status: DISCONTINUED | OUTPATIENT
Start: 2019-11-17 | End: 2019-11-20

## 2019-11-17 RX ORDER — ACETAMINOPHEN 500 MG
650 TABLET ORAL EVERY 6 HOURS
Refills: 0 | Status: DISCONTINUED | OUTPATIENT
Start: 2019-11-17 | End: 2019-11-20

## 2019-11-17 RX ADMIN — HYDROMORPHONE HYDROCHLORIDE 1 MILLIGRAM(S): 2 INJECTION INTRAMUSCULAR; INTRAVENOUS; SUBCUTANEOUS at 13:15

## 2019-11-17 RX ADMIN — HYDROMORPHONE HYDROCHLORIDE 1 MILLIGRAM(S): 2 INJECTION INTRAMUSCULAR; INTRAVENOUS; SUBCUTANEOUS at 23:18

## 2019-11-17 RX ADMIN — HYDROMORPHONE HYDROCHLORIDE 1 MILLIGRAM(S): 2 INJECTION INTRAMUSCULAR; INTRAVENOUS; SUBCUTANEOUS at 14:15

## 2019-11-17 RX ADMIN — CEFTRIAXONE 100 MILLIGRAM(S): 500 INJECTION, POWDER, FOR SOLUTION INTRAMUSCULAR; INTRAVENOUS at 18:12

## 2019-11-17 RX ADMIN — SODIUM CHLORIDE 140 MILLILITER(S): 9 INJECTION, SOLUTION INTRAVENOUS at 00:44

## 2019-11-17 RX ADMIN — HYDROMORPHONE HYDROCHLORIDE 1 MILLIGRAM(S): 2 INJECTION INTRAMUSCULAR; INTRAVENOUS; SUBCUTANEOUS at 22:24

## 2019-11-17 RX ADMIN — Medication 100 MILLIGRAM(S): at 05:27

## 2019-11-17 RX ADMIN — HYDROMORPHONE HYDROCHLORIDE 1 MILLIGRAM(S): 2 INJECTION INTRAMUSCULAR; INTRAVENOUS; SUBCUTANEOUS at 05:27

## 2019-11-17 RX ADMIN — Medication 100 MILLIGRAM(S): at 14:50

## 2019-11-17 RX ADMIN — Medication 100 MILLIGRAM(S): at 22:23

## 2019-11-17 RX ADMIN — HYDROMORPHONE HYDROCHLORIDE 1 MILLIGRAM(S): 2 INJECTION INTRAMUSCULAR; INTRAVENOUS; SUBCUTANEOUS at 04:39

## 2019-11-17 RX ADMIN — SODIUM CHLORIDE 140 MILLILITER(S): 9 INJECTION, SOLUTION INTRAVENOUS at 07:56

## 2019-11-17 RX ADMIN — HEPARIN SODIUM 5000 UNIT(S): 5000 INJECTION INTRAVENOUS; SUBCUTANEOUS at 22:24

## 2019-11-17 RX ADMIN — Medication 5 MILLIGRAM(S): at 22:23

## 2019-11-17 NOTE — PROGRESS NOTE ADULT - SUBJECTIVE AND OBJECTIVE BOX
OVERNIGHT EVENTS:    SUBJECTIVE / INTERVAL HPI: Patient seen and examined at bedside. Feels well overall, some pain at surgical site. Finished breakfast. Asking about the plaques and his heart.     VITAL SIGNS:  Vital Signs Last 24 Hrs  T(C): 36.8 (2019 08:48), Max: 37.7 (2019 19:30)  T(F): 98.2 (2019 08:48), Max: 99.8 (2019 19:30)  HR: 82 (2019 08:48) (77 - 90)  BP: 157/80 (2019 08:48) (131/80 - 161/81)  BP(mean): 97 (2019 15:30) (97 - 112)  RR: 17 (2019 08:48) (13 - 19)  SpO2: 93% (2019 08:48) (93% - 95%)    PHYSICAL EXAM:    General: WDWN  Neck: supple  Cardiovascular: +S1/S2; RRR  Respiratory: CTA b/l;   Gastrointestinal: mildly distended, minimal umbilical tenderness at lap site  Extremities: WWP; no edema, clubbing or cyanosis      MEDICATIONS:  MEDICATIONS  (STANDING):  artificial  tears Solution 1 Drop(s) Left EYE daily  bisacodyl 5 milliGRAM(s) Oral at bedtime  cefTRIAXone   IVPB 1000 milliGRAM(s) IV Intermittent every 24 hours  influenza   Vaccine 0.5 milliLiter(s) IntraMuscular once  lactated ringers. 1000 milliLiter(s) (140 mL/Hr) IV Continuous <Continuous>  metroNIDAZOLE  IVPB 500 milliGRAM(s) IV Intermittent every 8 hours    MEDICATIONS  (PRN):  acetaminophen   Tablet .. 650 milliGRAM(s) Oral every 6 hours PRN Mild Pain (1 - 3)  HYDROmorphone  Injectable 0.5 milliGRAM(s) IV Push every 4 hours PRN Moderate Pain (4 - 6)  HYDROmorphone  Injectable 1 milliGRAM(s) IV Push every 4 hours PRN Severe Pain (7 - 10)  ondansetron Injectable 4 milliGRAM(s) IV Push every 6 hours PRN Nausea      ALLERGIES:  Allergies    penicillin (Unknown)    Intolerances        LABS:                        13.8   12.60 )-----------( 193      ( 2019 07:22 )             42.5     11-17    137  |  102  |  13  ----------------------------<  129<H>  4.2   |  25  |  0.84    Ca    8.2<L>      2019 07:22  Phos  2.5       Mg     2.1         TPro  6.3  /  Alb  3.3  /  TBili  0.8  /  DBili  x   /  AST  47<H>  /  ALT  46<H>  /  AlkPhos  57      PT/INR - ( 2019 05:48 )   PT: 13.4 sec;   INR: 1.18          PTT - ( 15 Nov 2019 20:41 )  PTT:33.4 sec  Urinalysis Basic - ( 15 Nov 2019 21:02 )    Color: Yellow / Appearance: Clear / S.020 / pH: x  Gluc: x / Ketone: Trace mg/dL  / Bili: Negative / Urobili: 0.2 E.U./dL   Blood: x / Protein: NEGATIVE mg/dL / Nitrite: NEGATIVE   Leuk Esterase: NEGATIVE / RBC: < 5 /HPF / WBC < 5 /HPF   Sq Epi: x / Non Sq Epi: 0-5 /HPF / Bacteria: x      CAPILLARY BLOOD GLUCOSE          RADIOLOGY & ADDITIONAL TESTS: Reviewed.    ASSESSMENT:    PLAN:

## 2019-11-17 NOTE — PROGRESS NOTE ADULT - ASSESSMENT
60 yo F with PMH of obesity, chronic low back pain, rotator cuff repair, OA  presents with one day hx of abdominal pain. Pain localized to mid epigastric, RUQ region and characterized as dul/aching pain. Found to have acute cholecystitis along with findings of severe coronary calcifications on CTA. Medicine consulted for pre op clearance, now doing well post-op moses.

## 2019-11-17 NOTE — PROGRESS NOTE ADULT - ASSESSMENT
59M PMHx obesity, newly diagnosed CAD & HTN presents w/ acute cholecystitis. Now s/p lap moses    Pain/Nausea  CLD/IVF  Cef/Flagyl  OOBA/SCDs

## 2019-11-17 NOTE — PROGRESS NOTE ADULT - PROBLEM SELECTOR PLAN 2
advised to see cardiologist outpatient, f/u cardiology recs here  -[facility] [employer], denies angina symptoms

## 2019-11-17 NOTE — PROGRESS NOTE ADULT - SUBJECTIVE AND OBJECTIVE BOX
INTERVAL HPI/OVERNIGHT EVENTS:  Pt seen and examined bedside with chief residentJesse YA.    STATUS POST:  Laparoscopic cholecystectomy    POST OPERATIVE DAY #:  1    MEDICATIONS  (STANDING):  artificial  tears Solution 1 Drop(s) Left EYE daily  cefTRIAXone   IVPB 1000 milliGRAM(s) IV Intermittent every 24 hours  influenza   Vaccine 0.5 milliLiter(s) IntraMuscular once  lactated ringers. 1000 milliLiter(s) (140 mL/Hr) IV Continuous <Continuous>  metroNIDAZOLE  IVPB 500 milliGRAM(s) IV Intermittent every 8 hours    MEDICATIONS  (PRN):  HYDROmorphone  Injectable 0.5 milliGRAM(s) IV Push every 4 hours PRN Moderate Pain (4 - 6)  HYDROmorphone  Injectable 1 milliGRAM(s) IV Push every 4 hours PRN Severe Pain (7 - 10)  ondansetron Injectable 4 milliGRAM(s) IV Push every 6 hours PRN Nausea      Vital Signs Last 24 Hrs  T(C): 36.8 (2019 04:28), Max: 37.7 (2019 19:30)  T(F): 98.2 (2019 04:28), Max: 99.8 (2019 19:30)  HR: 77 (2019 04:34) (70 - 91)  BP: 161/81 (2019 04:34) (131/80 - 169/90)  BP(mean): 97 (2019 15:30) (97 - 120)  RR: 16 (2019 04:34) (11 - 19)  SpO2: 94% (2019 04:34) (92% - 95%)    PHYSICAL EXAM:    Gen: NAD, resting comfortably in bed  C/V: NSR  Pulm: Nonlabored breathing, no respiratory distress  Abd: soft, tender near incision sites, non-distended. Incision; c/d/i, lyric in place with sanguinous output  Extrem: WWP, no calf edema, SCDs in place    I&O's Detail    2019 07:01  -  2019 07:00  --------------------------------------------------------  IN:    lactated ringers.: 2590 mL    Solution: 300 mL    Solution: 50 mL  Total IN: 2940 mL    OUT:    Bulb: 82 mL    Voided: 1890 mL  Total OUT: 1972 mL    Total NET: 968 mL          LABS:                        13.8   12.60 )-----------( 193      ( 2019 07:22 )             42.5         137  |  102  |  13  ----------------------------<  129<H>  4.2   |  25  |  0.84    Ca    8.2<L>      2019 07:22  Phos  2.5       Mg     2.1         TPro  6.3  /  Alb  3.3  /  TBili  0.8  /  DBili  x   /  AST  47<H>  /  ALT  46<H>  /  AlkPhos  57      PT/INR - ( 2019 05:48 )   PT: 13.4 sec;   INR: 1.18          PTT - ( 15 Nov 2019 20:41 )  PTT:33.4 sec  Urinalysis Basic - ( 15 Nov 2019 21:02 )    Color: Yellow / Appearance: Clear / S.020 / pH: x  Gluc: x / Ketone: Trace mg/dL  / Bili: Negative / Urobili: 0.2 E.U./dL   Blood: x / Protein: NEGATIVE mg/dL / Nitrite: NEGATIVE   Leuk Esterase: NEGATIVE / RBC: < 5 /HPF / WBC < 5 /HPF   Sq Epi: x / Non Sq Epi: 0-5 /HPF / Bacteria: x        RADIOLOGY & ADDITIONAL STUDIES:

## 2019-11-18 LAB
ANION GAP SERPL CALC-SCNC: 10 MMOL/L — SIGNIFICANT CHANGE UP (ref 5–17)
BUN SERPL-MCNC: 14 MG/DL — SIGNIFICANT CHANGE UP (ref 7–23)
CALCIUM SERPL-MCNC: 8.2 MG/DL — LOW (ref 8.4–10.5)
CHLORIDE SERPL-SCNC: 103 MMOL/L — SIGNIFICANT CHANGE UP (ref 96–108)
CO2 SERPL-SCNC: 25 MMOL/L — SIGNIFICANT CHANGE UP (ref 22–31)
CREAT SERPL-MCNC: 0.79 MG/DL — SIGNIFICANT CHANGE UP (ref 0.5–1.3)
GLUCOSE SERPL-MCNC: 118 MG/DL — HIGH (ref 70–99)
HCT VFR BLD CALC: 40.2 % — SIGNIFICANT CHANGE UP (ref 39–50)
HGB BLD-MCNC: 13.5 G/DL — SIGNIFICANT CHANGE UP (ref 13–17)
MAGNESIUM SERPL-MCNC: 2.1 MG/DL — SIGNIFICANT CHANGE UP (ref 1.6–2.6)
MCHC RBC-ENTMCNC: 30.9 PG — SIGNIFICANT CHANGE UP (ref 27–34)
MCHC RBC-ENTMCNC: 33.6 GM/DL — SIGNIFICANT CHANGE UP (ref 32–36)
MCV RBC AUTO: 92 FL — SIGNIFICANT CHANGE UP (ref 80–100)
NRBC # BLD: 0 /100 WBCS — SIGNIFICANT CHANGE UP (ref 0–0)
PHOSPHATE SERPL-MCNC: 2.5 MG/DL — SIGNIFICANT CHANGE UP (ref 2.5–4.5)
PLATELET # BLD AUTO: 206 K/UL — SIGNIFICANT CHANGE UP (ref 150–400)
POTASSIUM SERPL-MCNC: 3.8 MMOL/L — SIGNIFICANT CHANGE UP (ref 3.5–5.3)
POTASSIUM SERPL-SCNC: 3.8 MMOL/L — SIGNIFICANT CHANGE UP (ref 3.5–5.3)
RBC # BLD: 4.37 M/UL — SIGNIFICANT CHANGE UP (ref 4.2–5.8)
RBC # FLD: 13.2 % — SIGNIFICANT CHANGE UP (ref 10.3–14.5)
SODIUM SERPL-SCNC: 138 MMOL/L — SIGNIFICANT CHANGE UP (ref 135–145)
WBC # BLD: 8.8 K/UL — SIGNIFICANT CHANGE UP (ref 3.8–10.5)
WBC # FLD AUTO: 8.8 K/UL — SIGNIFICANT CHANGE UP (ref 3.8–10.5)

## 2019-11-18 PROCEDURE — 93306 TTE W/DOPPLER COMPLETE: CPT | Mod: 26

## 2019-11-18 RX ORDER — POTASSIUM PHOSPHATE, MONOBASIC POTASSIUM PHOSPHATE, DIBASIC 236; 224 MG/ML; MG/ML
15 INJECTION, SOLUTION INTRAVENOUS ONCE
Refills: 0 | Status: COMPLETED | OUTPATIENT
Start: 2019-11-18 | End: 2019-11-18

## 2019-11-18 RX ADMIN — HYDROMORPHONE HYDROCHLORIDE 1 MILLIGRAM(S): 2 INJECTION INTRAMUSCULAR; INTRAVENOUS; SUBCUTANEOUS at 03:30

## 2019-11-18 RX ADMIN — SODIUM CHLORIDE 140 MILLILITER(S): 9 INJECTION, SOLUTION INTRAVENOUS at 23:22

## 2019-11-18 RX ADMIN — HYDROMORPHONE HYDROCHLORIDE 0.5 MILLIGRAM(S): 2 INJECTION INTRAMUSCULAR; INTRAVENOUS; SUBCUTANEOUS at 12:16

## 2019-11-18 RX ADMIN — HYDROMORPHONE HYDROCHLORIDE 0.5 MILLIGRAM(S): 2 INJECTION INTRAMUSCULAR; INTRAVENOUS; SUBCUTANEOUS at 17:44

## 2019-11-18 RX ADMIN — HYDROMORPHONE HYDROCHLORIDE 1 MILLIGRAM(S): 2 INJECTION INTRAMUSCULAR; INTRAVENOUS; SUBCUTANEOUS at 23:22

## 2019-11-18 RX ADMIN — Medication 650 MILLIGRAM(S): at 08:22

## 2019-11-18 RX ADMIN — HEPARIN SODIUM 5000 UNIT(S): 5000 INJECTION INTRAVENOUS; SUBCUTANEOUS at 15:21

## 2019-11-18 RX ADMIN — CEFTRIAXONE 100 MILLIGRAM(S): 500 INJECTION, POWDER, FOR SOLUTION INTRAMUSCULAR; INTRAVENOUS at 18:26

## 2019-11-18 RX ADMIN — POTASSIUM PHOSPHATE, MONOBASIC POTASSIUM PHOSPHATE, DIBASIC 63.75 MILLIMOLE(S): 236; 224 INJECTION, SOLUTION INTRAVENOUS at 12:16

## 2019-11-18 RX ADMIN — HYDROMORPHONE HYDROCHLORIDE 0.5 MILLIGRAM(S): 2 INJECTION INTRAMUSCULAR; INTRAVENOUS; SUBCUTANEOUS at 18:00

## 2019-11-18 RX ADMIN — HYDROMORPHONE HYDROCHLORIDE 1 MILLIGRAM(S): 2 INJECTION INTRAMUSCULAR; INTRAVENOUS; SUBCUTANEOUS at 03:07

## 2019-11-18 RX ADMIN — SODIUM CHLORIDE 140 MILLILITER(S): 9 INJECTION, SOLUTION INTRAVENOUS at 01:19

## 2019-11-18 RX ADMIN — Medication 100 MILLIGRAM(S): at 21:23

## 2019-11-18 RX ADMIN — Medication 100 MILLIGRAM(S): at 05:50

## 2019-11-18 RX ADMIN — HEPARIN SODIUM 5000 UNIT(S): 5000 INJECTION INTRAVENOUS; SUBCUTANEOUS at 21:23

## 2019-11-18 RX ADMIN — Medication 100 MILLIGRAM(S): at 15:21

## 2019-11-18 RX ADMIN — SODIUM CHLORIDE 140 MILLILITER(S): 9 INJECTION, SOLUTION INTRAVENOUS at 08:23

## 2019-11-18 RX ADMIN — Medication 5 MILLIGRAM(S): at 21:23

## 2019-11-18 RX ADMIN — HYDROMORPHONE HYDROCHLORIDE 0.5 MILLIGRAM(S): 2 INJECTION INTRAMUSCULAR; INTRAVENOUS; SUBCUTANEOUS at 12:33

## 2019-11-18 RX ADMIN — HEPARIN SODIUM 5000 UNIT(S): 5000 INJECTION INTRAVENOUS; SUBCUTANEOUS at 05:50

## 2019-11-18 NOTE — PROGRESS NOTE ADULT - SUBJECTIVE AND OBJECTIVE BOX
INTERVAL HPI/OVERNIGHT EVENTS:  Pt seen and examined bedside with chief resident. NPO w/ sips/chips overnight 2/2 nausea.     STATUS POST:  Laparoscopic cholecystectomy    POST OPERATIVE DAY #:  2    MEDICATIONS  (STANDING):  artificial  tears Solution 1 Drop(s) Left EYE daily  bisacodyl 5 milliGRAM(s) Oral at bedtime  cefTRIAXone   IVPB 1000 milliGRAM(s) IV Intermittent every 24 hours  heparin  Injectable 5000 Unit(s) SubCutaneous every 8 hours  influenza   Vaccine 0.5 milliLiter(s) IntraMuscular once  lactated ringers. 1000 milliLiter(s) (140 mL/Hr) IV Continuous <Continuous>  metroNIDAZOLE  IVPB 500 milliGRAM(s) IV Intermittent every 8 hours    MEDICATIONS  (PRN):  acetaminophen   Tablet .. 650 milliGRAM(s) Oral every 6 hours PRN Mild Pain (1 - 3)  HYDROmorphone  Injectable 0.5 milliGRAM(s) IV Push every 4 hours PRN Moderate Pain (4 - 6)  HYDROmorphone  Injectable 1 milliGRAM(s) IV Push every 4 hours PRN Severe Pain (7 - 10)  ondansetron Injectable 4 milliGRAM(s) IV Push every 6 hours PRN Nausea      Vital Signs Last 24 Hrs  T(C): 37.3 (18 Nov 2019 05:18), Max: 37.4 (17 Nov 2019 22:12)  T(F): 99.1 (18 Nov 2019 05:18), Max: 99.3 (17 Nov 2019 22:12)  HR: 78 (18 Nov 2019 05:18) (78 - 90)  BP: 144/74 (18 Nov 2019 05:18) (144/74 - 164/95)  BP(mean): --  RR: 18 (18 Nov 2019 05:18) (16 - 20)  SpO2: 96% (18 Nov 2019 05:18) (93% - 96%)    PHYSICAL EXAM:    Gen: NAD, resting comfortably in bed  C/V: NSR  Pulm: Nonlabored breathing, no respiratory distress  Abd: soft, tender near incision sites, non-distended. Incision; c/d/i, lyric in place with sanguinous output  Extrem: WWP, no calf edema, SCDs in place      I&O's Detail    17 Nov 2019 07:01  -  18 Nov 2019 07:00  --------------------------------------------------------  IN:    lactated ringers.: 3080 mL    Solution: 200 mL  Total IN: 3280 mL    OUT:    Bulb: 77 mL    Voided: 3650 mL  Total OUT: 3727 mL    Total NET: -447 mL          LABS:                        13.8   12.60 )-----------( 193      ( 17 Nov 2019 07:22 )             42.5     11-17    137  |  102  |  13  ----------------------------<  129<H>  4.2   |  25  |  0.84    Ca    8.2<L>      17 Nov 2019 07:22  Phos  2.5     11-17  Mg     2.1     11-17    TPro  6.3  /  Alb  3.3  /  TBili  0.8  /  DBili  x   /  AST  47<H>  /  ALT  46<H>  /  AlkPhos  57  11-17

## 2019-11-18 NOTE — PROGRESS NOTE ADULT - ASSESSMENT
59M PMHx obesity, newly diagnosed CAD & HTN presents w/ acute cholecystitis. Now s/p lap moses    Pain/Nausea  CLD/IVF  Cef/Flagyl  OOBA/SCDs  Bowel regimen

## 2019-11-19 LAB
-  CEFTRIAXONE: 0.06 — SIGNIFICANT CHANGE UP
-  CEFTRIAXONE: SIGNIFICANT CHANGE UP
-  CLINDAMYCIN: SIGNIFICANT CHANGE UP
-  ERYTHROMYCIN: SIGNIFICANT CHANGE UP
-  LEVOFLOXACIN: SIGNIFICANT CHANGE UP
-  PENICILLIN: SIGNIFICANT CHANGE UP
-  VANCOMYCIN: SIGNIFICANT CHANGE UP
ALBUMIN SERPL ELPH-MCNC: 3 G/DL — LOW (ref 3.3–5)
ALP SERPL-CCNC: 56 U/L — SIGNIFICANT CHANGE UP (ref 40–120)
ALT FLD-CCNC: 33 U/L — SIGNIFICANT CHANGE UP (ref 10–45)
ANION GAP SERPL CALC-SCNC: 12 MMOL/L — SIGNIFICANT CHANGE UP (ref 5–17)
AST SERPL-CCNC: 38 U/L — SIGNIFICANT CHANGE UP (ref 10–40)
BILIRUB SERPL-MCNC: 0.7 MG/DL — SIGNIFICANT CHANGE UP (ref 0.2–1.2)
BUN SERPL-MCNC: 12 MG/DL — SIGNIFICANT CHANGE UP (ref 7–23)
CALCIUM SERPL-MCNC: 8.1 MG/DL — LOW (ref 8.4–10.5)
CHLORIDE SERPL-SCNC: 100 MMOL/L — SIGNIFICANT CHANGE UP (ref 96–108)
CO2 SERPL-SCNC: 22 MMOL/L — SIGNIFICANT CHANGE UP (ref 22–31)
CREAT SERPL-MCNC: 0.69 MG/DL — SIGNIFICANT CHANGE UP (ref 0.5–1.3)
CULTURE RESULTS: SIGNIFICANT CHANGE UP
GLUCOSE SERPL-MCNC: 99 MG/DL — SIGNIFICANT CHANGE UP (ref 70–99)
HCT VFR BLD CALC: 41.6 % — SIGNIFICANT CHANGE UP (ref 39–50)
HGB BLD-MCNC: 13.7 G/DL — SIGNIFICANT CHANGE UP (ref 13–17)
MAGNESIUM SERPL-MCNC: 2 MG/DL — SIGNIFICANT CHANGE UP (ref 1.6–2.6)
MCHC RBC-ENTMCNC: 30.1 PG — SIGNIFICANT CHANGE UP (ref 27–34)
MCHC RBC-ENTMCNC: 32.9 GM/DL — SIGNIFICANT CHANGE UP (ref 32–36)
MCV RBC AUTO: 91.4 FL — SIGNIFICANT CHANGE UP (ref 80–100)
METHOD TYPE: SIGNIFICANT CHANGE UP
METHOD TYPE: SIGNIFICANT CHANGE UP
NRBC # BLD: 0 /100 WBCS — SIGNIFICANT CHANGE UP (ref 0–0)
ORGANISM # SPEC MICROSCOPIC CNT: SIGNIFICANT CHANGE UP
PHOSPHATE SERPL-MCNC: 3.2 MG/DL — SIGNIFICANT CHANGE UP (ref 2.5–4.5)
PLATELET # BLD AUTO: 252 K/UL — SIGNIFICANT CHANGE UP (ref 150–400)
POTASSIUM SERPL-MCNC: 3.7 MMOL/L — SIGNIFICANT CHANGE UP (ref 3.5–5.3)
POTASSIUM SERPL-SCNC: 3.7 MMOL/L — SIGNIFICANT CHANGE UP (ref 3.5–5.3)
PROT SERPL-MCNC: 6.2 G/DL — SIGNIFICANT CHANGE UP (ref 6–8.3)
RBC # BLD: 4.55 M/UL — SIGNIFICANT CHANGE UP (ref 4.2–5.8)
RBC # FLD: 12.7 % — SIGNIFICANT CHANGE UP (ref 10.3–14.5)
SODIUM SERPL-SCNC: 134 MMOL/L — LOW (ref 135–145)
SPECIMEN SOURCE: SIGNIFICANT CHANGE UP
WBC # BLD: 6.73 K/UL — SIGNIFICANT CHANGE UP (ref 3.8–10.5)
WBC # FLD AUTO: 6.73 K/UL — SIGNIFICANT CHANGE UP (ref 3.8–10.5)

## 2019-11-19 RX ORDER — POTASSIUM CHLORIDE 20 MEQ
10 PACKET (EA) ORAL
Refills: 0 | Status: COMPLETED | OUTPATIENT
Start: 2019-11-19 | End: 2019-11-19

## 2019-11-19 RX ADMIN — HYDROMORPHONE HYDROCHLORIDE 0.5 MILLIGRAM(S): 2 INJECTION INTRAMUSCULAR; INTRAVENOUS; SUBCUTANEOUS at 12:35

## 2019-11-19 RX ADMIN — Medication 100 MILLIGRAM(S): at 05:48

## 2019-11-19 RX ADMIN — Medication 100 MILLIGRAM(S): at 14:34

## 2019-11-19 RX ADMIN — Medication 100 MILLIEQUIVALENT(S): at 07:39

## 2019-11-19 RX ADMIN — SODIUM CHLORIDE 140 MILLILITER(S): 9 INJECTION, SOLUTION INTRAVENOUS at 20:16

## 2019-11-19 RX ADMIN — Medication 5 MILLIGRAM(S): at 21:00

## 2019-11-19 RX ADMIN — HEPARIN SODIUM 5000 UNIT(S): 5000 INJECTION INTRAVENOUS; SUBCUTANEOUS at 21:00

## 2019-11-19 RX ADMIN — HYDROMORPHONE HYDROCHLORIDE 1 MILLIGRAM(S): 2 INJECTION INTRAMUSCULAR; INTRAVENOUS; SUBCUTANEOUS at 06:30

## 2019-11-19 RX ADMIN — HYDROMORPHONE HYDROCHLORIDE 1 MILLIGRAM(S): 2 INJECTION INTRAMUSCULAR; INTRAVENOUS; SUBCUTANEOUS at 05:48

## 2019-11-19 RX ADMIN — HEPARIN SODIUM 5000 UNIT(S): 5000 INJECTION INTRAVENOUS; SUBCUTANEOUS at 14:34

## 2019-11-19 RX ADMIN — HYDROMORPHONE HYDROCHLORIDE 1 MILLIGRAM(S): 2 INJECTION INTRAMUSCULAR; INTRAVENOUS; SUBCUTANEOUS at 22:00

## 2019-11-19 RX ADMIN — HYDROMORPHONE HYDROCHLORIDE 0.5 MILLIGRAM(S): 2 INJECTION INTRAMUSCULAR; INTRAVENOUS; SUBCUTANEOUS at 12:20

## 2019-11-19 RX ADMIN — HYDROMORPHONE HYDROCHLORIDE 1 MILLIGRAM(S): 2 INJECTION INTRAMUSCULAR; INTRAVENOUS; SUBCUTANEOUS at 00:00

## 2019-11-19 RX ADMIN — Medication 100 MILLIEQUIVALENT(S): at 14:35

## 2019-11-19 RX ADMIN — CEFTRIAXONE 100 MILLIGRAM(S): 500 INJECTION, POWDER, FOR SOLUTION INTRAMUSCULAR; INTRAVENOUS at 18:49

## 2019-11-19 RX ADMIN — Medication 100 MILLIGRAM(S): at 21:00

## 2019-11-19 RX ADMIN — Medication 100 MILLIEQUIVALENT(S): at 11:48

## 2019-11-19 RX ADMIN — HEPARIN SODIUM 5000 UNIT(S): 5000 INJECTION INTRAVENOUS; SUBCUTANEOUS at 05:48

## 2019-11-19 RX ADMIN — HYDROMORPHONE HYDROCHLORIDE 1 MILLIGRAM(S): 2 INJECTION INTRAMUSCULAR; INTRAVENOUS; SUBCUTANEOUS at 21:00

## 2019-11-19 NOTE — PROGRESS NOTE ADULT - ASSESSMENT
59M PMHx obesity, newly diagnosed CAD & HTN presents w/ acute cholecystitis. Now s/p lap moses    Pain/Nausea  NPO sips and chips/IVF  Cef/Flagyl  OOBA/SCDs  Bowel regimen

## 2019-11-19 NOTE — PROGRESS NOTE ADULT - SUBJECTIVE AND OBJECTIVE BOX
INTERVAL HPI/OVERNIGHT EVENTS:  Pt seen and examined bedside with chief resident.  Endorsing flatus. Denies N/V.    STATUS POST:  Laparoscopic cholecystectomy    POST OPERATIVE DAY #:  3    MEDICATIONS  (STANDING):  artificial  tears Solution 1 Drop(s) Left EYE daily  bisacodyl 5 milliGRAM(s) Oral at bedtime  cefTRIAXone   IVPB 1000 milliGRAM(s) IV Intermittent every 24 hours  heparin  Injectable 5000 Unit(s) SubCutaneous every 8 hours  influenza   Vaccine 0.5 milliLiter(s) IntraMuscular once  lactated ringers. 1000 milliLiter(s) (140 mL/Hr) IV Continuous <Continuous>  metroNIDAZOLE  IVPB 500 milliGRAM(s) IV Intermittent every 8 hours  potassium chloride  10 mEq/100 mL IVPB 10 milliEquivalent(s) IV Intermittent every 1 hour    MEDICATIONS  (PRN):  acetaminophen   Tablet .. 650 milliGRAM(s) Oral every 6 hours PRN Mild Pain (1 - 3)  HYDROmorphone  Injectable 0.5 milliGRAM(s) IV Push every 4 hours PRN Moderate Pain (4 - 6)  HYDROmorphone  Injectable 1 milliGRAM(s) IV Push every 4 hours PRN Severe Pain (7 - 10)  ondansetron Injectable 4 milliGRAM(s) IV Push every 6 hours PRN Nausea      Vital Signs Last 24 Hrs  T(C): 36.8 (19 Nov 2019 05:37), Max: 37.4 (18 Nov 2019 20:46)  T(F): 98.2 (19 Nov 2019 05:37), Max: 99.3 (18 Nov 2019 20:46)  HR: 85 (19 Nov 2019 05:37) (77 - 87)  BP: 155/90 (19 Nov 2019 05:37) (139/85 - 155/90)  BP(mean): --  RR: 17 (19 Nov 2019 05:37) (17 - 19)  SpO2: 93% (19 Nov 2019 05:37) (92% - 96%)    PHYSICAL EXAM:    Gen: NAD, resting comfortably in bed  C/V: NSR  Pulm: Nonlabored breathing, no respiratory distress  Abd: soft, tender near incision sites, non-distended. Incision; c/d/i, lyric in place with sanguinous output  Extrem: WWP, no calf edema, SCDs in place    I&O's Detail    18 Nov 2019 07:01  -  19 Nov 2019 07:00  --------------------------------------------------------  IN:    lactated ringers.: 1540 mL    Solution: 300 mL    Solution: 100 mL  Total IN: 1940 mL    OUT:    Bulb: 35 mL    Voided: 2150 mL  Total OUT: 2185 mL    Total NET: -245 mL          LABS:                        13.7   6.73  )-----------( 252      ( 19 Nov 2019 06:26 )             41.6     11-19    134<L>  |  100  |  12  ----------------------------<  99  3.7   |  22  |  0.69    Ca    8.1<L>      19 Nov 2019 06:26  Phos  3.2     11-19  Mg     2.0     11-19    TPro  6.2  /  Alb  3.0<L>  /  TBili  0.7  /  DBili  x   /  AST  38  /  ALT  33  /  AlkPhos  56  11-19

## 2019-11-20 VITALS
TEMPERATURE: 98 F | RESPIRATION RATE: 17 BRPM | HEART RATE: 95 BPM | DIASTOLIC BLOOD PRESSURE: 88 MMHG | SYSTOLIC BLOOD PRESSURE: 131 MMHG | OXYGEN SATURATION: 94 %

## 2019-11-20 LAB
ALBUMIN SERPL ELPH-MCNC: 3.1 G/DL — LOW (ref 3.3–5)
ALP SERPL-CCNC: 88 U/L — SIGNIFICANT CHANGE UP (ref 40–120)
ALT FLD-CCNC: 34 U/L — SIGNIFICANT CHANGE UP (ref 10–45)
ANION GAP SERPL CALC-SCNC: 12 MMOL/L — SIGNIFICANT CHANGE UP (ref 5–17)
AST SERPL-CCNC: 41 U/L — HIGH (ref 10–40)
BILIRUB SERPL-MCNC: 0.8 MG/DL — SIGNIFICANT CHANGE UP (ref 0.2–1.2)
BUN SERPL-MCNC: 9 MG/DL — SIGNIFICANT CHANGE UP (ref 7–23)
CALCIUM SERPL-MCNC: 8.2 MG/DL — LOW (ref 8.4–10.5)
CHLORIDE SERPL-SCNC: 102 MMOL/L — SIGNIFICANT CHANGE UP (ref 96–108)
CO2 SERPL-SCNC: 22 MMOL/L — SIGNIFICANT CHANGE UP (ref 22–31)
CREAT SERPL-MCNC: 0.69 MG/DL — SIGNIFICANT CHANGE UP (ref 0.5–1.3)
GLUCOSE SERPL-MCNC: 106 MG/DL — HIGH (ref 70–99)
HCT VFR BLD CALC: 40.9 % — SIGNIFICANT CHANGE UP (ref 39–50)
HGB BLD-MCNC: 14 G/DL — SIGNIFICANT CHANGE UP (ref 13–17)
MAGNESIUM SERPL-MCNC: 2 MG/DL — SIGNIFICANT CHANGE UP (ref 1.6–2.6)
MCHC RBC-ENTMCNC: 30.8 PG — SIGNIFICANT CHANGE UP (ref 27–34)
MCHC RBC-ENTMCNC: 34.2 GM/DL — SIGNIFICANT CHANGE UP (ref 32–36)
MCV RBC AUTO: 90.1 FL — SIGNIFICANT CHANGE UP (ref 80–100)
NRBC # BLD: 0 /100 WBCS — SIGNIFICANT CHANGE UP (ref 0–0)
PHOSPHATE SERPL-MCNC: 3.4 MG/DL — SIGNIFICANT CHANGE UP (ref 2.5–4.5)
PLATELET # BLD AUTO: 274 K/UL — SIGNIFICANT CHANGE UP (ref 150–400)
POTASSIUM SERPL-MCNC: 3.7 MMOL/L — SIGNIFICANT CHANGE UP (ref 3.5–5.3)
POTASSIUM SERPL-SCNC: 3.7 MMOL/L — SIGNIFICANT CHANGE UP (ref 3.5–5.3)
PROT SERPL-MCNC: 6.1 G/DL — SIGNIFICANT CHANGE UP (ref 6–8.3)
RBC # BLD: 4.54 M/UL — SIGNIFICANT CHANGE UP (ref 4.2–5.8)
RBC # FLD: 12.7 % — SIGNIFICANT CHANGE UP (ref 10.3–14.5)
SODIUM SERPL-SCNC: 136 MMOL/L — SIGNIFICANT CHANGE UP (ref 135–145)
WBC # BLD: 7.15 K/UL — SIGNIFICANT CHANGE UP (ref 3.8–10.5)
WBC # FLD AUTO: 7.15 K/UL — SIGNIFICANT CHANGE UP (ref 3.8–10.5)

## 2019-11-20 RX ORDER — METRONIDAZOLE 500 MG
1 TABLET ORAL
Qty: 15 | Refills: 0
Start: 2019-11-20 | End: 2019-11-24

## 2019-11-20 RX ORDER — OXYCODONE AND ACETAMINOPHEN 5; 325 MG/1; MG/1
1 TABLET ORAL EVERY 4 HOURS
Refills: 0 | Status: DISCONTINUED | OUTPATIENT
Start: 2019-11-20 | End: 2019-11-20

## 2019-11-20 RX ORDER — OXYCODONE AND ACETAMINOPHEN 5; 325 MG/1; MG/1
2 TABLET ORAL EVERY 4 HOURS
Refills: 0 | Status: DISCONTINUED | OUTPATIENT
Start: 2019-11-20 | End: 2019-11-20

## 2019-11-20 RX ORDER — CEFPODOXIME PROXETIL 100 MG
1 TABLET ORAL
Qty: 10 | Refills: 0
Start: 2019-11-20 | End: 2019-11-24

## 2019-11-20 RX ORDER — POTASSIUM CHLORIDE 20 MEQ
40 PACKET (EA) ORAL ONCE
Refills: 0 | Status: COMPLETED | OUTPATIENT
Start: 2019-11-20 | End: 2019-11-20

## 2019-11-20 RX ORDER — DOCUSATE SODIUM 100 MG
1 CAPSULE ORAL
Qty: 6 | Refills: 0
Start: 2019-11-20 | End: 2019-11-22

## 2019-11-20 RX ADMIN — Medication 1 DROP(S): at 11:39

## 2019-11-20 RX ADMIN — HEPARIN SODIUM 5000 UNIT(S): 5000 INJECTION INTRAVENOUS; SUBCUTANEOUS at 05:47

## 2019-11-20 RX ADMIN — Medication 100 MILLIGRAM(S): at 05:46

## 2019-11-20 RX ADMIN — SODIUM CHLORIDE 140 MILLILITER(S): 9 INJECTION, SOLUTION INTRAVENOUS at 05:02

## 2019-11-20 RX ADMIN — HYDROMORPHONE HYDROCHLORIDE 1 MILLIGRAM(S): 2 INJECTION INTRAMUSCULAR; INTRAVENOUS; SUBCUTANEOUS at 05:46

## 2019-11-20 RX ADMIN — Medication 40 MILLIEQUIVALENT(S): at 11:39

## 2019-11-20 RX ADMIN — HYDROMORPHONE HYDROCHLORIDE 1 MILLIGRAM(S): 2 INJECTION INTRAMUSCULAR; INTRAVENOUS; SUBCUTANEOUS at 06:30

## 2019-11-20 NOTE — PROGRESS NOTE ADULT - ATTENDING COMMENTS
Doing better.  Afebrile.  Labs reviewed.  MATILDE - serosanguinous output.  Abdomen is less distended, soft, appropriately tender.  Started on clears.  Increase activity level.  Continue IV Abx.
Doing better.  No acute issues.  Labs are noted.  Ambulating better.  Pain is controlled.  Advance diet to low fat.  Hep lock IVF. PO meds.  Plan to D/C tomorrow.
Doing ok.  Pain is controlled.  Afebrile.  Labs noted.  Abdomen soft, appropriately tender, some distention.  No peritoneal signs.  MATILDE - serosanguinous output.  Start sips of clears.  Increase activity level.  Continue Abx.
No acute issues.  Less abdominal distention today.  Passing some flatus.  Nausea resolved.  Afebrile.  Labs are noted.  ECHO pending.  Clear diet.  OOB as tolerated.  Continue IV Abx.
Medicine to sign off. Please re-consult w/ questions.

## 2019-11-20 NOTE — PROGRESS NOTE ADULT - SUBJECTIVE AND OBJECTIVE BOX
SUBJECTIVE: jacquelyn FLD, +BM, no N/V. Patient seen and examined bedside by chief resident.    cefTRIAXone   IVPB 1000 milliGRAM(s) IV Intermittent every 24 hours  heparin  Injectable 5000 Unit(s) SubCutaneous every 8 hours  metroNIDAZOLE  IVPB 500 milliGRAM(s) IV Intermittent every 8 hours    MEDICATIONS  (PRN):  acetaminophen   Tablet .. 650 milliGRAM(s) Oral every 6 hours PRN Mild Pain (1 - 3)  HYDROmorphone  Injectable 0.5 milliGRAM(s) IV Push every 4 hours PRN Moderate Pain (4 - 6)  HYDROmorphone  Injectable 1 milliGRAM(s) IV Push every 4 hours PRN Severe Pain (7 - 10)  ondansetron Injectable 4 milliGRAM(s) IV Push every 6 hours PRN Nausea      I&O's Detail    19 Nov 2019 07:01  -  20 Nov 2019 07:00  --------------------------------------------------------  IN:    lactated ringers.: 1680 mL    Oral Fluid: 660 mL    Solution: 550 mL  Total IN: 2890 mL    OUT:    Bulb: 10 mL    Voided: 2700 mL  Total OUT: 2710 mL    Total NET: 180 mL          Vital Signs Last 24 Hrs  T(C): 36.9 (20 Nov 2019 05:35), Max: 37.1 (19 Nov 2019 23:57)  T(F): 98.4 (20 Nov 2019 05:35), Max: 98.8 (19 Nov 2019 23:57)  HR: 88 (20 Nov 2019 05:35) (80 - 98)  BP: 153/93 (20 Nov 2019 05:35) (142/89 - 162/96)  BP(mean): --  RR: 16 (20 Nov 2019 05:35) (16 - 19)  SpO2: 93% (20 Nov 2019 05:35) (93% - 97%)    General: NAD, resting comfortably in bed  C/V: NSR  Pulm: Nonlabored breathing, no respiratory distress  Abd: soft, NT/ND, incisions CDI    LABS:                        13.7   6.73  )-----------( 252      ( 19 Nov 2019 06:26 )             41.6     11-19    134<L>  |  100  |  12  ----------------------------<  99  3.7   |  22  |  0.69    Ca    8.1<L>      19 Nov 2019 06:26  Phos  3.2     11-19  Mg     2.0     11-19    TPro  6.2  /  Alb  3.0<L>  /  TBili  0.7  /  DBili  x   /  AST  38  /  ALT  33  /  AlkPhos  56  11-19

## 2019-11-20 NOTE — DISCHARGE NOTE PROVIDER - NSDCCPCAREPLAN_GEN_ALL_CORE_FT
PRINCIPAL DISCHARGE DIAGNOSIS  Diagnosis: Acute cholecystitis due to biliary calculus  Assessment and Plan of Treatment:

## 2019-11-20 NOTE — DISCHARGE NOTE PROVIDER - NSDCMRMEDTOKEN_GEN_ALL_CORE_FT
Colace 100 mg oral capsule: 1 cap(s) orally 2 times a day, As Needed -for constipation   Percocet 5/325 oral tablet: 1 tab(s) orally every 6 hours, As Needed -for severe pain MDD:4 cefpodoxime 200 mg oral tablet: 1 tab(s) orally every 12 hours   Colace 100 mg oral capsule: 1 cap(s) orally 2 times a day, As Needed -for constipation   Flagyl 500 mg oral tablet: 1 tab(s) orally 3 times a day   Percocet 5/325 oral tablet: 1 tab(s) orally every 6 hours, As Needed -for severe pain MDD:4

## 2019-11-20 NOTE — DISCHARGE NOTE PROVIDER - HOSPITAL COURSE
59M PMHx obesity, newly diagnosed CAD & HTN presents w/ acute cholecystitis. s/p lap cholecystectomy . Patient's post-operative course was uncomplicated. Diet was advanced as tolerated and pain was well controlled on medication. On day of discharge, pt deemed stable and ready to return home with plan to follow up as an outpatient.

## 2019-11-20 NOTE — PROGRESS NOTE ADULT - ASSESSMENT
59M PMHx obesity, newly diagnosed CAD & HTN presents w/ acute cholecystitis. Now POD 4 s/p lap moses    Pain/Nausea  FLD/IVF  Ceftriaxone/Flagyl  OOBA/SCDs  Bowel regimen

## 2019-11-20 NOTE — DISCHARGE NOTE NURSING/CASE MANAGEMENT/SOCIAL WORK - PATIENT PORTAL LINK FT
You can access the FollowMyHealth Patient Portal offered by [facility] by registering at the following website: [email or web address]. By joining [facility]’s FollowMyHealth portal, you will also be able to view your health information using other applications (apps) compatible with our system.

## 2019-11-20 NOTE — DISCHARGE NOTE PROVIDER - CARE PROVIDER_API CALL
Chaka Morales)  Surgery  [address], [address]  [place], [place] [facility]  Phone: [phone]  Fax: [phone]  Follow Up Time:

## 2019-11-20 NOTE — DISCHARGE NOTE PROVIDER - NSDCFUADDINST_GEN_ALL_CORE_FT
-For pain, you may take over-the-counter Tylenol and/or NSAIDs (such as motrin/advil) as labeled, as needed. For breakthrough pain you may take Percocet, which contains Tylenol. Do NOT exceed 4000mg acetaminophen/Tylenol total within 24 hours. Please take Colace 1 tab twice daily while taking narcotic pain medication to avoid constipation.  -No heavy lifting >20 pounds or strenuous exercise.  -You may remove your bandages 48 hours after surgery. Please keep wounds clean and dry.   -You may shower but NO baths and NO swimming. Keep your incisions clean & dry. Avoid a direct stream of water over incision sites. Do not scrub. Pat dry when done.  -Contact your doctor or go to the ER for fever > 101.5, chills, nausea, vomiting, chest pain, shortness of breath, pain not controlled by medication or excessive bleeding.  -Please follow up with Dr. Morales in one week; you may call the office to make an appointment at your earliest convenience.  -You have been prescribed oral antibiotics. Please be sure to complete the entire course as directed. -For pain, you may take over-the-counter Tylenol and/or NSAIDs (such as motrin/advil) as labeled, as needed. For breakthrough pain you may take Percocet, which contains Tylenol. Do NOT exceed 4000mg acetaminophen/Tylenol total within 24 hours. Please take Colace 1 tab twice daily while taking narcotic pain medication to avoid constipation.  -No heavy lifting >20 pounds or strenuous exercise.  -You may remove your bandages 48 hours after surgery. Please keep wounds clean and dry.   -You may shower but NO baths and NO swimming. Keep your incisions clean & dry. Avoid a direct stream of water over incision sites. Do not scrub. Pat dry when done.  -Contact your doctor or go to the ER for fever > 101.5, chills, nausea, vomiting, chest pain, shortness of breath, pain not controlled by medication or excessive bleeding.  -Please follow up with Dr. Morales in one week; you may call the office to make an appointment at your earliest convenience.  -You have been prescribed 2 oral antibiotics. Please be sure to complete the entire courses as directed.

## 2019-11-22 LAB — SURGICAL PATHOLOGY STUDY: SIGNIFICANT CHANGE UP

## 2019-11-23 DIAGNOSIS — I25.10 ATHEROSCLEROTIC HEART DISEASE OF NATIVE CORONARY ARTERY WITHOUT ANGINA PECTORIS: ICD-10-CM

## 2019-11-23 DIAGNOSIS — Z72.0 TOBACCO USE: ICD-10-CM

## 2019-11-23 DIAGNOSIS — K42.0 UMBILICAL HERNIA WITH OBSTRUCTION, WITHOUT GANGRENE: ICD-10-CM

## 2019-11-23 DIAGNOSIS — I10 ESSENTIAL (PRIMARY) HYPERTENSION: ICD-10-CM

## 2019-11-23 DIAGNOSIS — J43.9 EMPHYSEMA, UNSPECIFIED: ICD-10-CM

## 2019-11-23 DIAGNOSIS — Z72.89 OTHER PROBLEMS RELATED TO LIFESTYLE: ICD-10-CM

## 2019-11-23 DIAGNOSIS — K80.00 CALCULUS OF GALLBLADDER WITH ACUTE CHOLECYSTITIS WITHOUT OBSTRUCTION: ICD-10-CM

## 2019-11-23 DIAGNOSIS — K66.0 PERITONEAL ADHESIONS (POSTPROCEDURAL) (POSTINFECTION): ICD-10-CM

## 2019-11-23 DIAGNOSIS — Z88.0 ALLERGY STATUS TO PENICILLIN: ICD-10-CM

## 2019-11-23 DIAGNOSIS — R91.1 SOLITARY PULMONARY NODULE: ICD-10-CM

## 2019-11-23 DIAGNOSIS — E66.9 OBESITY, UNSPECIFIED: ICD-10-CM

## 2019-11-23 DIAGNOSIS — K76.9 LIVER DISEASE, UNSPECIFIED: ICD-10-CM

## 2019-12-02 PROCEDURE — 93306 TTE W/DOPPLER COMPLETE: CPT

## 2019-12-02 PROCEDURE — 96374 THER/PROPH/DIAG INJ IV PUSH: CPT | Mod: XU

## 2019-12-02 PROCEDURE — 99285 EMERGENCY DEPT VISIT HI MDM: CPT | Mod: 25

## 2019-12-02 PROCEDURE — 86850 RBC ANTIBODY SCREEN: CPT

## 2019-12-02 PROCEDURE — 36415 COLL VENOUS BLD VENIPUNCTURE: CPT

## 2019-12-02 PROCEDURE — 88304 TISSUE EXAM BY PATHOLOGIST: CPT

## 2019-12-02 PROCEDURE — 80307 DRUG TEST PRSMV CHEM ANLYZR: CPT

## 2019-12-02 PROCEDURE — 96375 TX/PRO/DX INJ NEW DRUG ADDON: CPT | Mod: XU

## 2019-12-02 PROCEDURE — 83690 ASSAY OF LIPASE: CPT

## 2019-12-02 PROCEDURE — 80061 LIPID PANEL: CPT

## 2019-12-02 PROCEDURE — 87075 CULTR BACTERIA EXCEPT BLOOD: CPT

## 2019-12-02 PROCEDURE — 85027 COMPLETE CBC AUTOMATED: CPT

## 2019-12-02 PROCEDURE — 76700 US EXAM ABDOM COMPLETE: CPT

## 2019-12-02 PROCEDURE — 86900 BLOOD TYPING SEROLOGIC ABO: CPT

## 2019-12-02 PROCEDURE — 86803 HEPATITIS C AB TEST: CPT

## 2019-12-02 PROCEDURE — 87205 SMEAR GRAM STAIN: CPT

## 2019-12-02 PROCEDURE — 74174 CTA ABD&PLVS W/CONTRAST: CPT

## 2019-12-02 PROCEDURE — 85730 THROMBOPLASTIN TIME PARTIAL: CPT

## 2019-12-02 PROCEDURE — 84484 ASSAY OF TROPONIN QUANT: CPT

## 2019-12-02 PROCEDURE — 81001 URINALYSIS AUTO W/SCOPE: CPT

## 2019-12-02 PROCEDURE — 71045 X-RAY EXAM CHEST 1 VIEW: CPT

## 2019-12-02 PROCEDURE — 93005 ELECTROCARDIOGRAM TRACING: CPT

## 2019-12-02 PROCEDURE — 80048 BASIC METABOLIC PNL TOTAL CA: CPT

## 2019-12-02 PROCEDURE — 82550 ASSAY OF CK (CPK): CPT

## 2019-12-02 PROCEDURE — 87070 CULTURE OTHR SPECIMN AEROBIC: CPT

## 2019-12-02 PROCEDURE — 85610 PROTHROMBIN TIME: CPT

## 2019-12-02 PROCEDURE — 88302 TISSUE EXAM BY PATHOLOGIST: CPT

## 2019-12-02 PROCEDURE — 84443 ASSAY THYROID STIM HORMONE: CPT

## 2019-12-02 PROCEDURE — 87184 SC STD DISK METHOD PER PLATE: CPT

## 2019-12-02 PROCEDURE — 83735 ASSAY OF MAGNESIUM: CPT

## 2019-12-02 PROCEDURE — 84100 ASSAY OF PHOSPHORUS: CPT

## 2019-12-02 PROCEDURE — 86901 BLOOD TYPING SEROLOGIC RH(D): CPT

## 2019-12-02 PROCEDURE — 80053 COMPREHEN METABOLIC PANEL: CPT

## 2019-12-02 PROCEDURE — 71275 CT ANGIOGRAPHY CHEST: CPT

## 2019-12-02 PROCEDURE — 83036 HEMOGLOBIN GLYCOSYLATED A1C: CPT
